# Patient Record
Sex: FEMALE | Race: WHITE | NOT HISPANIC OR LATINO | URBAN - METROPOLITAN AREA
[De-identification: names, ages, dates, MRNs, and addresses within clinical notes are randomized per-mention and may not be internally consistent; named-entity substitution may affect disease eponyms.]

---

## 2021-02-23 ENCOUNTER — PATIENT MESSAGE (OUTPATIENT)
Dept: ADMINISTRATIVE | Facility: CLINIC | Age: 71
End: 2021-02-23

## 2021-02-24 ENCOUNTER — IMMUNIZATION (OUTPATIENT)
Dept: INTERNAL MEDICINE | Facility: CLINIC | Age: 71
End: 2021-02-24
Payer: MEDICARE

## 2021-02-24 DIAGNOSIS — Z23 NEED FOR VACCINATION: Primary | ICD-10-CM

## 2021-02-24 PROCEDURE — 91300 COVID-19, MRNA, LNP-S, PF, 30 MCG/0.3 ML DOSE VACCINE: CPT | Mod: PBBFAC | Performed by: INTERNAL MEDICINE

## 2021-03-17 ENCOUNTER — IMMUNIZATION (OUTPATIENT)
Dept: INTERNAL MEDICINE | Facility: CLINIC | Age: 71
End: 2021-03-17
Payer: MEDICARE

## 2021-03-17 DIAGNOSIS — Z23 NEED FOR VACCINATION: Primary | ICD-10-CM

## 2021-03-17 PROCEDURE — 91300 COVID-19, MRNA, LNP-S, PF, 30 MCG/0.3 ML DOSE VACCINE: CPT | Mod: PBBFAC | Performed by: FAMILY MEDICINE

## 2021-03-17 PROCEDURE — 0002A COVID-19, MRNA, LNP-S, PF, 30 MCG/0.3 ML DOSE VACCINE: CPT | Mod: PBBFAC | Performed by: FAMILY MEDICINE

## 2021-03-19 ENCOUNTER — PATIENT MESSAGE (OUTPATIENT)
Dept: ADMINISTRATIVE | Facility: OTHER | Age: 71
End: 2021-03-19

## 2021-03-19 ENCOUNTER — NURSE TRIAGE (OUTPATIENT)
Dept: ADMINISTRATIVE | Facility: CLINIC | Age: 71
End: 2021-03-19

## 2024-04-02 DIAGNOSIS — R47.1 DYSARTHRIA: Primary | ICD-10-CM

## 2024-04-02 DIAGNOSIS — I63.412 CEREBROVASCULAR ACCIDENT (CVA) DUE TO EMBOLISM OF LEFT MIDDLE CEREBRAL ARTERY: ICD-10-CM

## 2024-04-02 DIAGNOSIS — R29.810 FACIAL DROOP: ICD-10-CM

## 2024-04-04 DIAGNOSIS — I63.412 ACUTE CEREBROVASCULAR ACCIDENT (CVA) DUE TO EMBOLISM OF LEFT MIDDLE CEREBRAL ARTERY: ICD-10-CM

## 2024-04-04 DIAGNOSIS — R29.810 FACIAL DROOP: ICD-10-CM

## 2024-04-04 DIAGNOSIS — R47.1 DYSARTHRIA: Primary | ICD-10-CM

## 2024-04-08 NOTE — PROGRESS NOTES
[facility]  Physical Therapy Initial Evaluation    Name: Pamella Ahmadi  Clinic Number: [id number]    Therapy Diagnosis:   Encounter Diagnoses   Name Primary?    Chronic bilateral low back pain without sciatica Yes    Decreased functional mobility and endurance      Physician: Donnell Posadas MD    Physician Orders: PT Eval and Treat   Medical Diagnosis from Referral:   R47.1 (ICD-10-CM) - Dysarthria   R29.810 (ICD-10-CM) - Facial droop   I63.412 (ICD-10-CM) - Cerebrovascular accident (CVA) due to embolism of left middle cerebral artery     Evaluation Date: 4/9/2024  Authorization Period Expiration:12/31/24  Plan of Care Expiration: 6/18/24  Visit # / Visits authorized: 1/ 1  FOTO: 1/3  PT progress note due: 30 days from 4/9/24    Precautions: Standard, hx of dizziness. LOB to the R (R vision loss since CVA) and R neglect with using RW at this time so stay SBA to CGA for safety    Time In: 3:12 pm  Time Out: 4:05 am  Total Time: 53 minutes      Subjective   Date of onset: March 30, 2024  History of current condition - Pamella reports: Pt reports that she had 2 strokes. On March 30, she reports sneezing really hard and losing vision in her right eye. She went to ER and was held overnight, but discharged. She then reports having her second stroke that next night on 4/1/2024, noticing that she was limping when she got up to use the restroom. The next morning, the right side of her face was drooping and her speech was slurred. She went back to the hospital and was admitted for 4 days, then discharged back home on 4/4/2024. She reports that her right arm movement has greatly improved since onset and she feels like she's not dragging her foot as much anymore. She reports that she knows what she wants to say, and what she wants to write, but the words (and handwriting) don't come out as intended. She reports going to the eye doctor this am and her eyes were dilated.      Pain:  Current 4/10,  worst 8/10, best 4/10   Location: denies  pain from the CVA but a hx of low back pain  Description: sore ache/stiff  Aggravating Factors: bent over the walker  Easing Factors: sit to rest    Prior Level of Function: Independent with all ADL/IADL tasks including  of small children 3 months old and up. No assistive device in past. Dizziness at times in the past with head turnings. Uses hearing aide.  Current Level of Function: using a RW for ambulation, needing assistance to shower, to  grandchildren, and to cook)     Home/Living environment: lives with her spouse, 5 adopted foster children (grown), and grandchildren     Leisure: watch TV, play games on phone ([email or web address]), and reading                Occupation:    Working presently: retired  Duties: household chores, paying bills    Imaging: see chart    Medical History:   No past medical history on file.    Surgical History:   Pamella Ahmadi  has no past surgical history on file.    Medications:   Pamella currently has no medications in their medication list.    Allergies:   Review of patient's allergies indicates:  Not on File     Pts goals: less back pain and less LOB     Objective       CMS Impairment/Limitation/Restriction for FOTO low back pain Survey    Therapist reviewed FOTO scores for Pamella Ahmadi on 4/9/2024.   FOTO documents entered into [employer] - see Media section.    Functional Score: 43       Posture/Structure: Pt presents with forward head posture, rounded shoulder's, reduced cervical lordosis, reduced thoracic kyphosis, and reduced lumbar lordosis.     Gait: anterior lean, LOB to the R, R side neglect due to LOB on R, anterior pelvic tilt    Sensation:    Intact to light touch but poor proprioception    Balance: Single leg stand R=2 sec, L=2 sec; squat sit to stand test without UE support for 30 sec 8 reps  Sit to stand has LOB at times posterior  Modified CTSIB test: to be performed on follow up as she was fatigued and simple wide  MOLLY no UE balance fatigued after 10 sec so PT didn't work on unlevel jacqui challenges.    CROM  Flexion 50  Extension 50  R Side flexion 25  L  30  R rotation 65  L 70    Lumbar spine AROM:     Degrees Pain Present (Y/N)   Flexion 5 inches from floor and having some LOB n   R side bend 15 n   L 15 n   Extend 20 n     Hip AROM:     Degrees (R/L) Pain Present (Y/N)   Hip flex 120/115 n   Hip Abd 30 n   Hip ER (sitting) 40/35 n   Hip IR (sitting) 30/40 n   Hip Ext (prone) Lags 5 degrees from neutral n     Transfers:  PT noted bed mobility is challenging for turning to side or onto stomach needing up to MIN A at times  Sit to stand at times MIN A due to LOB       Strength:    R L   Hip flexors L2   4/5 4/5  Quadriceps L234  4/5 4/5    Hamstrings S1  4+/5 4+/5   Plantar flexion S1  2/5 2/5    Dorsiflexion L4  4/5 4/5   Gluteus Medius L45S1 3+/5 3+/5   Gluteus Byron  2+/5 2+/5      Core activation with PPT 2/5; ball squeeze 4/5 hip strength    Special Test:  Slump Test:  -      Quadrant:  + extension    Be:  +      YONATAN  -        Palpation: tight lumbar paraspinal and tight suboccipitals and upper traps, tight glutes B    Neural Tension Test: negative in LE's and UE's    TREATMENT   Treatment Time In: 3:40 pm  Treatment Time Out: 4:10  pm  Total Treatment time separate from Evaluation: 30 minutes    Pamella participated in neuromuscular re-education activities to improve: Balance, Kinesthetic, and Proprioception for 30 minutes. The following activities were included:    Seated trunk flexion 2x10  Sit to stand with 2x10  Seated heel raises 2x 10  Posterior pelvic tilt 3x 10  PPT with ball squeeze in hook lying 3x 10  PT reviewed HEP with pt and bed mobility cues and forward lean cues for transfers      Home Exercises and Patient Education Provided    Education provided:   -Education on condition, HEP, and decreased endurance is very notable so PT explained to pt we will work on this and her back pain from  leaning forward so much and to address LOB concerns and R side neglect due to some Loss of vision on the R.    Written Home Exercises Provided: Patient instructed to cont prior HEP.  Exercises were reviewed and Pamella was able to demonstrate them prior to the end of the session.  Pamella demonstrated good  understanding of the education provided.     See EMR under Patient Instructions for exercises provided 2024.    Assessment   Pamella is a 73 y.o. female referred to outpatient Physical Therapy with a medical diagnosis of   R47.1 (ICD-10-CM) - Dysarthria   R29.810 (ICD-10-CM) - Facial droop   I63.412 (ICD-10-CM) - Cerebrovascular accident (CVA) due to embolism of left middle cerebral artery   . The patient presents with signs and symptoms consistent with diagnosis along with  functional mobility and endurance and with impairments which include impairments list: ROM, strength, endurance, joint mobility, muscle length, balance, posture, gait mechanics, core strength and stability, functional movement patterns, and vision loss and easily fatigued.  These impairments are limiting patient's ability to walk, manage home tasks and .     Pt prognosis is Good.   Pt will benefit from skilled outpatient Physical Therapy to address the deficits stated above and in the chart below, provide pt/family education, and to maximize pt's level of independence.     Plan of care discussed with patient: Yes  Pt's spiritual, cultural and educational needs considered and patient is agreeable to the plan of care and goals as stated below:     Anticipated Barriers for therapy: multiple family members in home    Medical Necessity is demonstrated by the following  History  Co-morbidities and personal factors that may impact the plan of care [x] LOW: no personal factors / co-morbidities  [] MODERATE: 1-2 personal factors / co-morbidities  [] HIGH: 3+ personal factors / co-morbidities    Moderate / High Support  Documentation:   Co-morbidities affecting plan of care: na    Personal Factors:   lifestyle, social background     Examination  Body Structures and Functions, activity limitations and participation restrictions that may impact the plan of care [x] LOW: addressing 1-2 elements  [] MODERATE: 3+ elements  [] HIGH: 4+ elements (please support below)    Moderate / High Support Documentation: na     Clinical Presentation [x] LOW: stable  [] MODERATE: Evolving  [] HIGH: Unstable     Decision Making/ Complexity Score: low         Goals:  Short Term Goals: In 4 weeks   1.I with HEP  2.Pt to increase lumbar ROM to reaching floor without LOB    3.Pt to increase core strength to 2+/5 with core activation.  4.Pt to increase BLE strength to 4+/5 with hip abduction   5.Pt to have pain less than 3/10 or better at all times.  6.Pt to improve score on the FOTO by 10%.  7. Pt to be educated on postural/body mechanics awareness.    Long Term Goals: In 10 weeks 6/18/24  1.Patient to improve score on the FOTO to 61.  2. Patient to demo increase in LE strength to 5/5 with hip abduction  3. Patient to have decreased pain to 2/10 at worst.  4. Patient to demo increase single leg stand to 8-10 sec per leg .  5. Patient to increase dead lifting up to 50 # to manage grandchild care.      Plan   Plan of care Certification: 4/9/2024 to 6/18/24.    Outpatient Physical Therapy 2 times weekly for 10 weeks to include the following interventions: Cervical/Lumbar Traction, Electrical Stimulation IFC, NMES, Gait Training, Manual Therapy, Moist Heat/ Ice, Neuromuscular Re-ed, Patient Education, Self Care, Therapeutic Activities, Therapeutic Exercise, and DN .     Lidia Wood, PT, CIDN, SFMA    Thank you for this referral.    These services are reasonable and necessary for the conditions set forth above while under my care.

## 2024-04-09 ENCOUNTER — CLINICAL SUPPORT (OUTPATIENT)
Dept: REHABILITATION | Facility: HOSPITAL | Age: 74
End: 2024-04-09
Attending: INTERNAL MEDICINE
Payer: MEDICARE

## 2024-04-09 ENCOUNTER — CLINICAL SUPPORT (OUTPATIENT)
Dept: REHABILITATION | Facility: HOSPITAL | Age: 74
End: 2024-04-09
Payer: MEDICARE

## 2024-04-09 DIAGNOSIS — R47.01 APHASIA: Primary | ICD-10-CM

## 2024-04-09 DIAGNOSIS — M54.50 CHRONIC BILATERAL LOW BACK PAIN WITHOUT SCIATICA: Primary | ICD-10-CM

## 2024-04-09 DIAGNOSIS — Z74.09 DECREASED FUNCTIONAL MOBILITY AND ENDURANCE: ICD-10-CM

## 2024-04-09 DIAGNOSIS — R27.8 DECREASED COORDINATION: ICD-10-CM

## 2024-04-09 DIAGNOSIS — R29.898 WEAKNESS OF RIGHT UPPER EXTREMITY: Primary | ICD-10-CM

## 2024-04-09 DIAGNOSIS — R47.1 DYSARTHRIA: ICD-10-CM

## 2024-04-09 DIAGNOSIS — G89.29 CHRONIC BILATERAL LOW BACK PAIN WITHOUT SCIATICA: Primary | ICD-10-CM

## 2024-04-09 PROCEDURE — 97112 NEUROMUSCULAR REEDUCATION: CPT | Mod: PN

## 2024-04-09 PROCEDURE — 97165 OT EVAL LOW COMPLEX 30 MIN: CPT | Mod: PN

## 2024-04-09 PROCEDURE — 97161 PT EVAL LOW COMPLEX 20 MIN: CPT | Mod: PN

## 2024-04-09 PROCEDURE — 97535 SELF CARE MNGMENT TRAINING: CPT | Mod: PN

## 2024-04-09 PROCEDURE — 92523 SPEECH SOUND LANG COMPREHEN: CPT | Mod: PN

## 2024-04-09 NOTE — PLAN OF CARE
[facility]  Speech Therapy Evaluation -Neurological Rehabilitation    Date: 4/9/2024     Name: Pamella Ahmadi   MRN: [id number]    Therapy Diagnosis:   Encounter Diagnoses   Name Primary?    Aphasia Yes    Dysarthria       Physician: Donnell Posadas MD  Physician Orders: Ambulatory Referral to Speech Therapy   Medical Diagnosis:   R47.1 (ICD-10-CM) - Dysarthria   R29.810 (ICD-10-CM) - Facial droop   I63.412 (ICD-10-CM) - Cerebral infarction due to embolism of left middle cerebral artery       Visit # / Visits Authorized:  1 / 1   Date of Evaluation:  4/9/2024   Insurance Authorization Period: 4/2/2024 to 12/31/2024  Plan of Care Certification:    4/9/2024 to 7/2/2024      Time In: 12:00 PM    Time Out: 12:35 PM    Total time: 35 minutes     Procedure   Speech Language Evaluation      Precautions: Standard and Fall  Subjective   Date of Onset: 4/2/2024  History of Current Condition:  Pamella Ahmadi is a 73 y.o. female who presents to [facility] Outpatient Speech Therapy for evaluation secondary to dysarthria. Patient was referred to therapy by Donnell Posadas MD , which is the patient's hospital medicine physician. Patient reports Over the weekend (4/5/24 - 4/6/24), she experienced an episode of sudden vision loss and went to the emergency department. She was diagnosed with central retina artery occlusion. MRI revealed no acute findings. The night after discharge, she developed balance deficits along with slurred speech and facial droop and right-sided weakness. She returned to the emergency room and was admitted to hospital medicine services. Repeat MRI revealed acute nonhemorrhagic lacunar infarct left frontal periventricular corona radiata white matter extending into the left external capsule.    Past Medical History: Pamella Ahmadi  has no past medical history on file.  Pamella Ahmadi  has no past surgical history on file.  Medical Hx and Allergies: Pamella currently has no  "medications in their medication list. Review of patient's allergies indicates:  Not on File  Prior Therapy:  Acute care ST; Patient will also participate in outpatient physical and occupational therapy  Social History:  Patient lives with her  and children and grandchildren   Occupation:  Retired   Prior Level of Function: Independent    Current Level of Function: Mild aphasia, facial droop, vision loss   Pain Scale: 0/10 on a Visual Analog Scale currently.  Pain Location: N/A  Patient's Therapy Goals:  To improve word finding     Relevant imagin2024: MRI with impressions per Fuad Rodriguez MD:   "Interval development of an acute nonhemorrhagic lacunar infarct left frontal periventricular corona radiata white matter extending into the left external capsule.  No evidence of mass effect or brain herniation. "    Chart review:   2024: Acute care SLP evaluation with impressions per Gisell Mtz CCC-SLP:   "Impressions/Plan  Ms. Ahmadi presents with mild dysarthria as well as decreased labial and lingual strength. ST to follow-up with a focus on teaching dysarthria compensatory strategies and labial/lingual resistance exercises."      Objective   Formal Assessment:  LANGUAGE EVALUATION  Western Aphasia Battery - Revised (WAB-R) was administered to evaluate the patient's receptive and expressive language function.The purpose stated in the manual for the WAB-R is to determine the presence, severity, and type of aphasia; measure the patient's level of performance to provide a baseline for detecting change over time; provide a comprehensive assessment of the patients language strengths and deficits in order to guide treatment and management; infer the location and etiology of the lesion causing the aphasia.  The following results were revealed:     DOMAIN SCORE   Spontaneous Speech Score 20/20   Auditory Comprehension Score  10/10   Repetition Score  10/10   Naming and Word Finding Score 9.7/10 " "  Aphasia Quotient (AQ) 99.4/100   Aphasia Classification  WFL     Although the patient's WAB-R score is technically within functional limits, she did demonstrate occasional word finding difficulty outside of the administration of this assessment. For example, she said "nephew" when she meant to say "grandson." She reported this is her main concern since her stroke. 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 The patient could benefit from speech language therapy to address mild word finding deficits in conversational speech.       MOTOR SPEECH/DYSARTHRIA EVALUATION  Evaluation of Speech Mechanisms  Respiration:  Posture: WNL  Breath Support: WNL  Breath Rate: WNL.   Respiratory Features: WNL: Diaphragmatic Breathing    Oral Mechanism at Rest   Labial Structures  Structure WNL   Symmetry Right   Tone reduced   Ability to control secretions WNL       Lingual Structure (at rest in mouth)   Structure WNL   Symmetry WNL   Tone WNL   Irregular Movement WNL       Mandible  Symmetry WNL   Posture at Rest WNL=closed   Dentition WNL     Face  Symmetry Right Facial Droop   Expression WNL   Irregular Movement WNL     Soft Palate  Symmetry WNL   Structure WNL     Hard Palate  Structure WNL     Oral Mechanism during Sustained Postures   Labial Retraction   Symmetry Right Reduced   Range Reduced   Tone Reduced   Strength Reduced     Labial Protrusion  Range Reduced   Tone Reduced   Strength Reduced     Labial Compression  Strength (Upper R) Reduced   Strength (Upper L) WNL   Strength (Lower R) Reduced   Strength (Lower L) WNL     Lingual Protrusion  Symmetry WNL   Range WNL   Tone WNL   Tremor WNL   Strength WNL     Lingual Elevation to Alveolar Ridge   Range WNL   Symmetry WNL     Mandible Depression  Symmetry Right " "  Range Reduced   Jaw Clonus WNL   Strength WNL     Mandible Elevation  Symmetry WNL   Range WNL   Strength WNL     Face: Raising Eyebrows  Symmetry WNL   Range WNL   Forehead Wrinkle WNL     Velopharyngeal Function: Cheek Puffing   Symmetry WNL   Range WNL   Tone WNL   Strength WNL       Oral Mechanism during Movement   Labial Protrusion and Lateralization   Rate Slow   Rhythm WNL     Lingual Lateralization (External)   Rate Slow   Rhythm WNL   Range WNL     Lingual Lateralization (Internal)  Rate Slow   Rhythm WNL   Range WNL     Circular Range of Motion (External)   Rate Slow   Rhythm WNL   Range WNL     Circular Range of Motion (Internal)  Rate Slow   Rhythm WNL   Range WNL     Velopharyngeal Function: Sustained /a/  Velum Range WNL   Pharyngeal Wall Range WNL     Velopharyngeal Function: Short Repeating /a/  Velum Range WNL   Pharyngeal Wall Range WNL     Gross Sensation  Sensation  Face: Upper L WNL    Upper R WNL    Lower L WNL    Lower R WNL   Lips: Upper L WNL    Upper R WNL    Lower L WNL    Lower R WNL     Diagnosis   OVERALL IMPRESSION:   Patient presents with decreased labial strength and ROM specifically on the right side. However, this does not appear to be impacting her speech at this time and the "slurred" speech appears to have resolved. She remains 100% intelligible is does not report concerns regarding speech clarity at this time.     Treatment   Total Treatment Time Separate from Evaluation: not applicable   No treatment performed secondary to time to complete evaluation.     Education provided:   -role of Speech Therapy, goals/plan of care, scheduling/cancellations, insurance limitations with patient  -Additional Education provided:   The nature of aphasia versus dysarthria    Patient expressed understanding.     Home Program: not yet established  Assessment     Pamella presents to [facility] status post medical diagnosis of Dysarthria.     Interpretation of objective " assessment:   She presents with a mild right labial droop that does not impact speech clarity or oral phase of the swallow. She presents with very mild anomic asphasia characterized by word finding difficulty at the conversational level and semantic paraphasias. She will benefit form skilled therapy to address word finding deficits in higher level language tasks.     Demonstrates impairments including limitations as described in the problem list.     Positive prognostic factors: Patient motivation/ recent onset   Negative prognostic factors: none  Barriers to therapy: No barriers to therapy identified.     Patient's spiritual, cultural, and educational needs considered and patient agreeable to plan of care and goals.    Patient will benefit from skilled therapy.    Rehab Potential: excellent        Short Term Goals: (6 weeks) Current Progress:   1. Patient will complete reading/writing assessment.     Progressing/ Not Met 4/9/2024   Established this date   2. Patient will use Semantic Feature Analysis to describe objects with 90% accuracy and will self-monitor verbal output of message with moderate clinician redirection.     Progressing/ Not Met 4/9/2024   Established this date   3.  Patient will utilize word finding strategies in structured tasks with 90% accuracy and minimal cues.     Progressing/ Not Met 4/9/2024   Established this date    4. Patient will achieve 90% accuracy on oral expression while reading sentences out loud in unison with use of the Oral Reading for Language in Aphasia Protocol (DREA).     Progressing/ Not Met 4/9/2024   Established this date    5.  Patient will summarize read material to improve word fluency, word finding, and reading comprehension in Attentive Reading and Constrained Summarization (ARCS) protocol with 80% accuracy and minimal verbal assistance across 2-3 sessions.    Progressing/ Not Met 4/9/2024   Established this date        Long Term Goals: (12 weeks) Current Progress:    1. Patient will improve oral expression for improved overall communication in medical, social, vocational, home environments.    Established this date     2. Patient will improve reading comprehension and writing skills for overall improved communication in medical, social, vocational, home environments.      Established this date         Plan     Recommended Treatment Plan:  Patient will participate in the [facility] rehabilitation program for speech therapy 1 times per week for 12 weeks to address her Communication deficits, to educate patient and their family, and to participate in a home exercise program.    Other Recommendations:   Referral to Neurology    Therapist's Name:     Mercy Pang, CCC-SLP, CBIS   Speech Language Pathologist   Certified Brain Injury Specialist   4/11/2024

## 2024-04-09 NOTE — PATIENT INSTRUCTIONS
Practice handwriting and journaling (used lined paper). Try speaking words out loud too.  Practice utilizing stylus on your phone.

## 2024-04-09 NOTE — PLAN OF CARE
[facility]   Occupational Therapy Initial Neurological Evaluation     Date: 4/9/2024  Patient: Pamella Ahmadi  Chart Number: [id number]    Therapy Diagnosis:   Encounter Diagnoses   Name Primary?    Weakness of right upper extremity Yes    Decreased coordination      Physician: Donnell Posadas MD    Physician Orders: OT eval and tx  Medical Diagnosis: Dysarthria [R47.1], Facial droop [R29.810], Cerebrovascular accident (CVA) due to embolism of left middle cerebral artery [I63.412]   Evaluation Date: 4/9/2024  Insurance Authorization Period Expiration: 12/31/2024  Plan of Care Certification Period: 4/9/2024 to 6/7/2024  Progress Note Due: 30 days (or 5/9/2024)     Visit # / Visits authorized: 1/1  FOTO: 1/3  Date of Return to MD: sees cardiologist on 4/10/2024 and PCP on 4/11/2024    Precautions: HTN; implanted cardiac monitor (ICM); pre-diabetic    Time In: 1050  Time Out: 1150  Total Appointment Time (timed & untimed codes): 60 minutes    Subjective     Involved Side: right  Dominant Side: Right    Date of Onset: 3/30/2024  Mechanism of Injury/ History of Current Condition: Pt reports that she had 2 strokes. On March 30, she reports sneezing really hard and losing vision in her right eye. She went to ER and was held overnight, but discharged. She then reports having her second stroke that next night on 4/1/2024, noticing that she was limping when she got up to use the restroom. The next morning, the right side of her face was drooping and her speech was slurred. She went back to the hospital and was admitted for 4 days, then discharged back home on 4/4/2024. She reports that her right arm movement has greatly improved since onset and she feels like she's not dragging her foot as much anymore. She reports that she knows what she wants to say, and what she wants to wrist, but the words (and handwriting) don't come out as intended. She reports going to the eye doctor this am and her eyes  "were dilated.    Surgical Procedure: none  Imaging: none    Previous Therapy: limited therapy in acute care while admitted for 4 days    Patient's Goals for Therapy: "help me improve my handwriting and forming my numbers and letters better"    Pain:  Functional Pain Scale Rating 0-10:   n/a/10 on average  n/a/10 at best  n/a/10 at worst  Location: RUE    Functional Limitations/Social History:    Prior Level of Function: Independent with all ADL/IADL tasks  Current Level of Function: using a RW for ambulation, needing assistance to shower, to  grandchildren, and to cook)    Home/Living environment: lives with her spouse, 5 adopted foster children (grown), and grandchildren    Leisure: watch TV, play games on phone ([employer]), and reading     Occupation:    Working presently: retired  Duties: household chores, paying bills      Past Medical History/Physical Systems Review:     Past Medical History:  Pamella Ahmadi  has no past medical history on file.    Past Surgical History:  Pamella Ahmadi  has no past surgical history on file.    Current Medications:  Pamella currently has no medications in their medication list.    Allergies:  Review of patient's allergies indicates:  Not on File       Objective     Cognitive Exam:  Oriented: Person, Place, Time, and Situation  Behaviors: normal, cooperative  Follows Commands/attention: Follows multistep commands  Communication: dysarthria  Memory: No Deficits noted   Safety awareness/insight to disability: aware of diagnosis, treatment, and prognosis  Coping skills/emotional control: Appropriate to situation    Visual/Perceptual:  Tracking: intact    Comments: eyes dilated during evaluation 2* eye doctor apt this am; pt reports right eye vision obscured (looks like gray paint with little scratches in it); reports left eye is a little blurry    Right Upper Extremity AROM: WNL as compared to LUE    Right Fist: normal  Able to form a full composite, hook, and lumbrical " fist      Strength 4/9/2024 4/9/2024   **within available ROM** Right Left   Shoulder Flex 4+/5 5/5   Shoulder Abd 4+/5 5/5   Shoulder IR 5/5 5/5   Shoulder ER 4+/5 5/5   Elbow Flex 5/5 5/5   Elbow Ext 4+/5 5/5      Strength: (LISANDRA Dynamometer in lbs.) Average 3 trials, Position II:     4/9/2024 4/9/2024    Right Left   Rung II 40# 35#     Pinch Strength (Measured in psi)     4/9/2024 4/9/2024    Right Left   Key Pinch 13 psi 13 psi   3pt Pinch 11 psi 9 psi   2pt Pinch 7 psi 7 psi     Fine Motor Coordination: 9-Hole Peg Test  Right  4/9/2024 Left  4/9/2024   27 sec 33 sec   Unable to assess with LISANDRA Grooved Pegboard 2* eyes dilated    Tone:  Modified Eleni Scale:   0 - No increase in muscle tone    Sensation:    Light touch: right intact  Sharp/Dull: right intact  Kinesthesia: right intact  Proprioception: right impaired  Temperature: right intact    Endurance Deficit: moderate - pt reports fatiguing easily                     Functional Status      Functional Mobility: (uses a RW)  Stand to sit: Mod I  Sit to stand: Mod I  Transfers to tub/shower: Mod I  Tub/shower combo with shower seat  and grab bars - must step over  Transfers to toilet: Mod I  Car transfers: Mod I  Wheelchair mobility: n/a    ADL's:  Feeding: I  Grooming: I  Hygiene: I  UB Dressing: I  LB Dressing: I  Toileting: I  Bathing: Min A    IADL's:  Homecare: Min A  Cooking: Min A  Laundry: Min A  Yard work: Max A  Use of telephone: I  Money management: Min A  Medication management: Min A  Handwriting: Mod A  Technology Use: Min A      Intake Outcome Measure for FOTO Stroke Upper Extremity Survey    Therapist reviewed FOTO scores for Pamella Ahmadi on 4/9/2024.   FOTO documents entered into [employer] - see Media section.    Intake Score: 59%     Predicted Functional Score: 73%     Treatment     Total Treatment time separate from Evaluation time: 25 minutes    Pamella received the treatments listed below:      neuromuscular re-education  activities to improve: Coordination for 10 minutes, including:  - HEP    Self-care techniques to improve independence and safety with ADL/IADL tasks for 15 minutes, including:  - toileting and toilet transfer  - grooming: hand hygiene  - sit<-->stand transfers  - education on the healing process and expectations    Home Exercises and Patient Education Provided    Education provided:   - Role of OT, goals for OT, scheduling/cancellations    Written Home Exercises Provided: Yes  Exercises were reviewed and Pamella was able to demonstrate them prior to the end of the session. Pamella demonstrated good  understanding of the education provided.     See EMR under Patient Instructions for exercises provided on 4/9/2024    Assessment     Pamella Ahmadi is a 73 y.o. female referred to outpatient occupational therapy and presents with a medical diagnosis of Dysarthria [R47.1], Facial droop [R29.810], Cerebrovascular accident (CVA) due to embolism of left middle cerebral artery [I63.412], resulting in decreased muscle strength and impaired function and demonstrates limitations as described in the chart below.     Following medical record review it is determined that patient will benefit from occupational therapy services in order to maximize pain free and/or functional use of right UE.    Patient prognosis is Good   Patient will benefit from skilled outpatient Occupational Therapy to address the deficits stated above and in the chart below, provide patient/family education, and to maximize patient's level of independence.     Plan of care discussed with patient: Yes  Patient's spiritual, cultural and educational needs considered and patient is agreeable to the plan of care and goals as stated below:     Anticipated Barriers for therapy: none    Medical Necessity is demonstrated by the following  Occupational Profile/History  Co-morbidities and personal factors that may impact the plan of care [x] LOW: Brief chart review  []  MODERATE: Expanded chart review   [] HIGH: Extensive chart review    Moderate / High Support Documentation: n/a     Examination  Performance deficits relating to physical, cognitive or psychosocial skills that result in activity limitations and/or participation restrictions  [x] LOW: addressing 1-3 Performance deficits  [] MODERATE: 3-5 Performance deficits  [] HIGH: 5+ Performance deficits (please support below)    Moderate / High Support Documentation:    Physical:  Muscle Power/Strength  Muscle Endurance  Fine Motor Coordination  Proprioception Functions    Cognitive:  No Deficits    Psychosocial:    No Deficits     Treatment Options [x] LOW: Limited options  [] MODERATE: Several options  [] HIGH: Multiple options      Decision Making/ Complexity Score: low       The following goals were discussed with the patient and patient is in agreement with them as to be addressed in the treatment plan.     Goals:  Short Term Goals: 4 weeks   Pt will be independent with HEP.  Pt will tolerate right upper extremity strengthening and endurance activities.  Pt will report increased legibility of handwriting.  Pt will report an increase in FOTO intake score of > 62%, which would indicate an improvement in quality of life.    Long Term Goals: 8 weeks   Pt will exhibit right shoulder MMT/strength of 5/5 needed for picking up grandchildren.  Pt will exhibit improved coordination, control, and legibility with all handwriting tasks.  Pt will report an increase in FOTO intake score of > 68%, which would indicate an improvement in quality of life.    Plan   Certification Period/Plan of care expiration: 4/9/2024 to 6/7/2024    Outpatient Occupational Therapy 1-2 times weekly for 8 weeks to include the following interventions: Manual Therapy, Moist Heat/ Ice, Neuromuscular Re-ed, Patient Education, Self Care, Therapeutic Activities, and Therapeutic Exercise.    VIVIENNE ZUNIGA OT

## 2024-04-09 NOTE — PROGRESS NOTES
See initial POC.     Mercy Pang, CCC-SLP, CBIS   Speech Language Pathologist   Certified Brain Injury Specialist   4/9/2024

## 2024-04-09 NOTE — PLAN OF CARE
[facility]  Physical Therapy Initial Evaluation    Name: Pamella Ahmadi  Clinic Number: [id number]    Therapy Diagnosis:   Encounter Diagnoses   Name Primary?    Chronic bilateral low back pain without sciatica Yes    Decreased functional mobility and endurance      Physician: Donnell Posadas MD    Physician Orders: PT Eval and Treat   Medical Diagnosis from Referral:   R47.1 (ICD-10-CM) - Dysarthria   R29.810 (ICD-10-CM) - Facial droop   I63.412 (ICD-10-CM) - Cerebrovascular accident (CVA) due to embolism of left middle cerebral artery     Evaluation Date: 4/9/2024  Authorization Period Expiration:12/31/24  Plan of Care Expiration: 6/18/24  Visit # / Visits authorized: 1/ 1  FOTO: 1/3  PT progress note due: 30 days from 4/9/24    Precautions: Standard, hx of dizziness. LOB to the R (R vision loss since CVA) and R neglect with using RW at this time so stay SBA to CGA for safety    Time In: 3:12 pm  Time Out: 4:05 am  Total Time: 53 minutes      Subjective   Date of onset: March 30, 2024  History of current condition - Pamella reports: Pt reports that she had 2 strokes. On March 30, she reports sneezing really hard and losing vision in her right eye. She went to ER and was held overnight, but discharged. She then reports having her second stroke that next night on 4/1/2024, noticing that she was limping when she got up to use the restroom. The next morning, the right side of her face was drooping and her speech was slurred. She went back to the hospital and was admitted for 4 days, then discharged back home on 4/4/2024. She reports that her right arm movement has greatly improved since onset and she feels like she's not dragging her foot as much anymore. She reports that she knows what she wants to say, and what she wants to write, but the words (and handwriting) don't come out as intended. She reports going to the eye doctor this am and her eyes were dilated.      Pain:  Current 4/10,  worst 8/10, best 4/10   Location: denies  pain from the CVA but a hx of low back pain  Description: sore ache/stiff  Aggravating Factors: bent over the walker  Easing Factors: sit to rest    Prior Level of Function: Independent with all ADL/IADL tasks including  of small children 3 months old and up. No assistive device in past. Dizziness at times in the past with head turnings. Uses hearing aide.  Current Level of Function: using a RW for ambulation, needing assistance to shower, to  grandchildren, and to cook)     Home/Living environment: lives with her spouse, 5 adopted foster children (grown), and grandchildren     Leisure: watch TV, play games on phone ([employer]), and reading                Occupation:    Working presently: retired  Duties: household chores, paying bills    Imaging: see chart    Medical History:   No past medical history on file.    Surgical History:   Pamella Ahmadi  has no past surgical history on file.    Medications:   Pamella currently has no medications in their medication list.    Allergies:   Review of patient's allergies indicates:  Not on File     Pts goals: less back pain and less LOB     Objective       CMS Impairment/Limitation/Restriction for FOTO low back pain Survey    Therapist reviewed FOTO scores for Pamella Ahmadi on 4/9/2024.   FOTO documents entered into [employer] - see Media section.    Functional Score: 43       Posture/Structure: Pt presents with forward head posture, rounded shoulder's, reduced cervical lordosis, reduced thoracic kyphosis, and reduced lumbar lordosis.     Gait: anterior lean, LOB to the R, R side neglect due to LOB on R, anterior pelvic tilt    Sensation:    Intact to light touch but poor proprioception    Balance: Single leg stand R=2 sec, L=2 sec; squat sit to stand test without UE support for 30 sec 8 reps  Sit to stand has LOB at times posterior  Modified CTSIB test: to be performed on follow up as she was fatigued and simple wide  MOLLY no UE balance fatigued after 10 sec so PT didn't work on unlevel jacqui challenges.    CROM  Flexion 50  Extension 50  R Side flexion 25  L  30  R rotation 65  L 70    Lumbar spine AROM:     Degrees Pain Present (Y/N)   Flexion 5 inches from floor and having some LOB n   R side bend 15 n   L 15 n   Extend 20 n     Hip AROM:     Degrees (R/L) Pain Present (Y/N)   Hip flex 120/115 n   Hip Abd 30 n   Hip ER (sitting) 40/35 n   Hip IR (sitting) 30/40 n   Hip Ext (prone) Lags 5 degrees from neutral n     Transfers:  PT noted bed mobility is challenging for turning to side or onto stomach needing up to MIN A at times  Sit to stand at times MIN A due to LOB       Strength:    R L   Hip flexors L2   4/5 4/5  Quadriceps L234  4/5 4/5    Hamstrings S1  4+/5 4+/5   Plantar flexion S1  2/5 2/5    Dorsiflexion L4  4/5 4/5   Gluteus Medius L45S1 3+/5 3+/5   Gluteus Byron  2+/5 2+/5      Core activation with PPT 2/5; ball squeeze 4/5 hip strength    Special Test:  Slump Test:  -      Quadrant:  + extension    Be:  +      YONATAN  -        Palpation: tight lumbar paraspinal and tight suboccipitals and upper traps, tight glutes B    Neural Tension Test: negative in LE's and UE's    TREATMENT   Treatment Time In: 3:40 pm  Treatment Time Out: 4:10  pm  Total Treatment time separate from Evaluation: 30 minutes    Pamella participated in neuromuscular re-education activities to improve: Balance, Kinesthetic, and Proprioception for 30 minutes. The following activities were included:    Seated trunk flexion 2x10  Sit to stand with 2x10  Seated heel raises 2x 10  Posterior pelvic tilt 3x 10  PPT with ball squeeze in hook lying 3x 10  PT reviewed HEP with pt and bed mobility cues and forward lean cues for transfers      Home Exercises and Patient Education Provided    Education provided:   -Education on condition, HEP, and decreased endurance is very notable so PT explained to pt we will work on this and her back pain from  leaning forward so much and to address LOB concerns and R side neglect due to some Loss of vision on the R.    Written Home Exercises Provided: Patient instructed to cont prior HEP.  Exercises were reviewed and Pamella was able to demonstrate them prior to the end of the session.  Pamella demonstrated good  understanding of the education provided.     See EMR under Patient Instructions for exercises provided 2024.    Assessment   Pamella is a 73 y.o. female referred to outpatient Physical Therapy with a medical diagnosis of   R47.1 (ICD-10-CM) - Dysarthria   R29.810 (ICD-10-CM) - Facial droop   I63.412 (ICD-10-CM) - Cerebrovascular accident (CVA) due to embolism of left middle cerebral artery   . The patient presents with signs and symptoms consistent with diagnosis along with  functional mobility and endurance and with impairments which include impairments list: ROM, strength, endurance, joint mobility, muscle length, balance, posture, gait mechanics, core strength and stability, functional movement patterns, and vision loss and easily fatigued.  These impairments are limiting patient's ability to walk, manage home tasks and .     Pt prognosis is Good.   Pt will benefit from skilled outpatient Physical Therapy to address the deficits stated above and in the chart below, provide pt/family education, and to maximize pt's level of independence.     Plan of care discussed with patient: Yes  Pt's spiritual, cultural and educational needs considered and patient is agreeable to the plan of care and goals as stated below:     Anticipated Barriers for therapy: multiple family members in home    Medical Necessity is demonstrated by the following  History  Co-morbidities and personal factors that may impact the plan of care [x] LOW: no personal factors / co-morbidities  [] MODERATE: 1-2 personal factors / co-morbidities  [] HIGH: 3+ personal factors / co-morbidities    Moderate / High Support  Documentation:   Co-morbidities affecting plan of care: na    Personal Factors:   lifestyle, social background     Examination  Body Structures and Functions, activity limitations and participation restrictions that may impact the plan of care [x] LOW: addressing 1-2 elements  [] MODERATE: 3+ elements  [] HIGH: 4+ elements (please support below)    Moderate / High Support Documentation: na     Clinical Presentation [x] LOW: stable  [] MODERATE: Evolving  [] HIGH: Unstable     Decision Making/ Complexity Score: low         Goals:  Short Term Goals: In 4 weeks   1.I with HEP  2.Pt to increase lumbar ROM to reaching floor without LOB    3.Pt to increase core strength to 2+/5 with core activation.  4.Pt to increase BLE strength to 4+/5 with hip abduction   5.Pt to have pain less than 3/10 or better at all times.  6.Pt to improve score on the FOTO by 10%.  7. Pt to be educated on postural/body mechanics awareness.    Long Term Goals: In 10 weeks 6/18/24  1.Patient to improve score on the FOTO to 61.  2. Patient to demo increase in LE strength to 5/5 with hip abduction  3. Patient to have decreased pain to 2/10 at worst.  4. Patient to demo increase single leg stand to 8-10 sec per leg .  5. Patient to increase dead lifting up to 50 # to manage grandchild care.      Plan   Plan of care Certification: 4/9/2024 to 6/18/24.    Outpatient Physical Therapy 2 times weekly for 10 weeks to include the following interventions: Cervical/Lumbar Traction, Electrical Stimulation IFC, NMES, Gait Training, Manual Therapy, Moist Heat/ Ice, Neuromuscular Re-ed, Patient Education, Self Care, Therapeutic Activities, Therapeutic Exercise, and DN.     Lidia Wood, PT, CIDN, SFMA    Thank you for this referral.    These services are reasonable and necessary for the conditions set forth above while under my care.

## 2024-04-16 ENCOUNTER — CLINICAL SUPPORT (OUTPATIENT)
Dept: REHABILITATION | Facility: HOSPITAL | Age: 74
End: 2024-04-16
Payer: MEDICARE

## 2024-04-16 DIAGNOSIS — Z74.09 DECREASED FUNCTIONAL MOBILITY AND ENDURANCE: ICD-10-CM

## 2024-04-16 DIAGNOSIS — M54.50 CHRONIC BILATERAL LOW BACK PAIN WITHOUT SCIATICA: Primary | ICD-10-CM

## 2024-04-16 DIAGNOSIS — G89.29 CHRONIC BILATERAL LOW BACK PAIN WITHOUT SCIATICA: Primary | ICD-10-CM

## 2024-04-16 DIAGNOSIS — R29.898 WEAKNESS OF BOTH LOWER EXTREMITIES: Primary | ICD-10-CM

## 2024-04-16 DIAGNOSIS — R27.8 DECREASED COORDINATION: ICD-10-CM

## 2024-04-16 PROCEDURE — 97110 THERAPEUTIC EXERCISES: CPT | Mod: PN

## 2024-04-16 PROCEDURE — 97530 THERAPEUTIC ACTIVITIES: CPT | Mod: PN

## 2024-04-16 PROCEDURE — 97112 NEUROMUSCULAR REEDUCATION: CPT | Mod: PN

## 2024-04-16 PROCEDURE — 97535 SELF CARE MNGMENT TRAINING: CPT | Mod: PN

## 2024-04-16 NOTE — PROGRESS NOTES
Physical Therapy Daily Treatment Note     Name: Pamella Ahmadi  Clinic Number: [id number]    Therapy Diagnosis:   Encounter Diagnoses   Name Primary?    Chronic bilateral low back pain without sciatica Yes    Decreased functional mobility and endurance      Physician: Donnell Posadas MD     Visit Date: 4/16/2024    Physician Orders: PT Eval and Treat   Medical Diagnosis from Referral:   R47.1 (ICD-10-CM) - Dysarthria   R29.810 (ICD-10-CM) - Facial droop   I63.412 (ICD-10-CM) - Cerebrovascular accident (CVA) due to embolism of left middle cerebral artery      Evaluation Date: 4/9/2024  Authorization Period Expiration:12/31/24  Plan of Care Expiration: 6/18/24  Visit # / Visits authorized: 2/ 20  FOTO: 1/3  PT progress note due: 30 days from 4/9/24     Precautions: Standard, hx of dizziness. LOB to the R (R vision loss since CVA) and R neglect with using RW at this time so stay SBA to CGA for safety      Time In: 2:03 pm   Time Out: 2:58 pm  Total Time: 54 minutes    SUBJECTIVE     Today, pt reports: she had some lateral shin nodule that has reduced but didn't know the cause.  She was compliant with home exercise program.  Response to previous treatment: sore  Functional change: none yet    Pre-Treatment Pain: 5/10    Location: R low back  TREATMENT     Pamella received therapeutic exercises to develop strength and flexibility for 8 minutes including:    Shuttle 4 bands 3 min increase quad motor control  Prone maisha 2x 8 (Ue's fatigue; feels pulling in core)      Pamella received the following manual therapy techniques: Manual traction and Soft tissue Mobilization were applied to the: 0 for 0 minutes, including:    Pamella participated in neuromuscular re-education activities to improve: Balance, Kinesthetic, and Proprioception for 46 minutes. The following activities were included:    Heel raises 2x 10  Toe raises 2x 10  SLR 2x 10 B increase quad motor control  PEC with plinth elevated  L glute med engaged  with plinth elevated 3x 10  Leaning hip extension 2x 10  Seated LAQ 4# 3x 10 per leg add ball squeeze on set 2 and 3      Pamella participated in dynamic functional therapeutic activities to improve functional performance for 0  minutes, including:          Pamella participated in gait training to improve functional mobility and safety for 0  minutes, including:      Pamella received the following direct contact modalities after being cleared for contraindications:     Pamella received the following supervised modalities after being cleared for contradictions:     Pamella received hot pack for 0 minutes to 0.    Pamella received cold pack for 0 minutes to 0.  Pamella received electrical stimulation for 0 minutes to 0      Home Exercises Provided and Patient Education Provided     Education/Self-Care provided: (during therex)    Patient educated on the importance of improved core and upper and lower extremity strength in order to improve alignment of the spine and upper and lower extremities with static positions and dynamic movement.   Patient educated on the importance of strong core and lower extremity musculature in order to improve both static and dynamic balance, improve gait mechanics, reduce fall risk and improve household and community mobility.       Written Home Exercises Provided: Patient instructed to cont prior HEP.  Exercises were reviewed and Pamella was able to demonstrate them prior to the end of the session.  Pamella demonstrated good  understanding of the education provided.     See EMR under Patient Instructions for exercises provided 4/16/2024.    ASSESSMENT   Pt tolerated therex  with PT focusing on motor control in the hips and core as she is weak and feels R low back pain from core instability as well as the R knee feels it wants to buckle at times. L LAQ was more poor in eccentric control than the R so pt was surprised. PT to advance as tolerated as she fatigues quickly. Pt neglects  the R with gait some and is very challenged by bed mobility so safety cues used to reduce rolling off edge of bed.  Pt demonstrated good understanding of exercises and required moderate cueing to maintain proper form. Pt fatigues quickly and became nauseated mildly at end of session. PT held off on continuing and suggested a cool rag and cool water but she declined for now. Pt may be having some vestibular issues on trunk rotation machine so PT to monitor. PT educated pt on signs of DVT and assessed pedal and femoral pulse and both were intact in the R UE and no irregular swelling and discoloration of the skin was noted. PT educated pt to go to the ER of chest pain began in any way since she was concerned her varicose veins may be a DVT.      Pamella Is progressing well towards her goals.   Pt prognosis is Good.     Pt will continue to benefit from skilled outpatient physical therapy to address the deficits listed in the problem list box on initial evaluation, provide pt/family education and to maximize pt's level of independence in the home and community environment.     Pt's spiritual, cultural and educational needs considered and pt agreeable to plan of care and goals.     Anticipated barriers to physical therapy: multiple strokes    Goals:   Short Term Goals: In 4 weeks   1.I with HEP  2.Pt to increase lumbar ROM to reaching floor without LOB    3.Pt to increase core strength to 2+/5 with core activation.  4.Pt to increase BLE strength to 4+/5 with hip abduction   5.Pt to have pain less than 3/10 or better at all times.  6.Pt to improve score on the FOTO by 10%.  7. Pt to be educated on postural/body mechanics awareness.     Long Term Goals: In 10 weeks 6/18/24  1.Patient to improve score on the FOTO to 61.  2. Patient to demo increase in LE strength to 5/5 with hip abduction  3. Patient to have decreased pain to 2/10 at worst.  4. Patient to demo increase single leg stand to 8-10 sec per leg .  5. Patient to  increase dead lifting up to 50 # to manage grandchild care.        Plan   Plan of care Certification: 4/9/2024 to 6/18/24.     Outpatient Physical Therapy 2 times weekly for 10 weeks to include the following interventions: Cervical/Lumbar Traction, Electrical Stimulation IFC, NMES, Gait Training, Manual Therapy, Moist Heat/ Ice, Neuromuscular Re-ed, Patient Education, Self Care, Therapeutic Activities, Therapeutic Exercise, and DN.      Continue Plan of Care (POC) and progress per patient tolerance.    Lidia Wood, PT, CIDN, SFMA

## 2024-04-16 NOTE — PROGRESS NOTES
"  [facility]  Occupational Therapy Treatment Note     Date: 4/16/2024  Name: Pamella Ahmadi  Clinic Number: [id number]    Therapy Diagnosis:   Encounter Diagnoses   Name Primary?    Weakness of both lower extremities Yes    Decreased coordination      Physician: Donnell Posadas MD    Physician Orders: OT eval and tx  Medical Diagnosis: Dysarthria [R47.1], Facial droop [R29.810], Cerebrovascular accident (CVA) due to embolism of left middle cerebral artery [I63.412]   Evaluation Date: 4/9/2024  Insurance Authorization Period Expiration: 9/29/2025  Plan of Care Certification Period: 4/9/2024 to 6/7/2024  Progress Note Due: 30 days (or 5/9/2024)      Visit # / Visits authorized: 1/20  FOTO: 1/3  Date of Return to MD: PRN     Precautions: HTN; implanted cardiac monitor (ICM); pre-diabetic    Time In: 1300  Time Out: 1400  Total Billable Time: 60 minutes    Subjective     Pt reports: "I did my homework. My right arm is feeling pretty good."    she was compliant with home exercise program given on evaluation.  Response to previous treatment: good  Functional change: improved handwriting legibility    Pain: 0/10 (none on evaluation)  Location: RUE    Objective   Objective measures updated at progress report unless specified.    Treatment     Pamella received the treatments listed below:      therapeutic exercises to develop strength and ROM for 15 minutes including:  - UBE x 6 min  - chest press with 3# dowel 2x10  - overhead press with 3# dowel 2x10  - composite  strengthening with Progressive Hand Exerciser (3rd position) 5x10    neuromuscular re-education activities to improve Coordination and Proprioception for 0 minutes including:  - NT    therapeutic activities to improve functional performance of RUE for 35 minutes including:  - practiced different handwriting techniques: writing on horizontal vs vertical surfaces, using different-sized pens, changing paper position, using " darker-lined paper    Self-care x 10 minutes:  - toileting and grooming with [place]    direct contact modalities after being cleared for contraindications for 0 minutes including:  - NT    supervised modalities after being cleared for contradictions for 0 minutes including:  - NT    Home Exercises and Education Provided     Education provided:   - reviewed HEP issued at evaluation  - progress toward goals    Written Home Exercises Provided: Patient instructed to cont prior HEP  Exercises were reviewed and Pamella was able to demonstrate them prior to the end of the session. Pamella demonstrated good understanding of the HEP provided.     See EMR under Patient Instructions for exercises provided during prior visit.        Assessment     Pamella was seen for her first tx session since initial evaluation on this date. She was compliant with practicing her handwriting at home and brought a paper full of handwriting to therapy. Handwriting fully legible but pt indicated concerns about letter spacing and upward drifting of her words. She appears to be most limited by right visual deficits rather than coordination with her handwriting. Recommended she use dark-lined paper.     Pamella is progressing towards her goals and there are no updates to goals at this time. Pt prognosis is Good.     Pamella will continue to benefit from skilled outpatient occupational therapy services to address the deficits listed in the problem list on initial evaluation, to provide pt/family education, and to maximize pt's level of independence in the home and community environment.     Pt's spiritual, cultural and educational needs considered and pt agreeable to plan of care and goals.    Anticipated barriers to occupational therapy: none    Goals:  Short Term Goals: 4 weeks   Pt will be independent with HEP. - MET 4/16/2024  Pt will tolerate right upper extremity strengthening and endurance activities.  Pt will report increased  legibility of handwriting. - MET 4/16/2024  Pt will report an increase in FOTO intake score of > 62%, which would indicate an improvement in quality of life.     Long Term Goals: 8 weeks   Pt will exhibit right shoulder MMT/strength of 5/5 needed for picking up grandchildren.  Pt will exhibit improved coordination, control, and legibility with all handwriting tasks.  Pt will report an increase in FOTO intake score of > 68%, which would indicate an improvement in quality of life.    Plan     Certification Period/Plan of care expiration: 4/9/2024 to 6/7/2024     Outpatient Occupational Therapy 1-2 times weekly for 8 weeks to include the following interventions: Manual Therapy, Moist Heat/ Ice, Neuromuscular Re-ed, Patient Education, Self Care, Therapeutic Activities, and Therapeutic Exercise    Updates/Grading for next session: progress occupational therapy as tolerated    VIVIENNE ZUNIGA OT

## 2024-04-19 ENCOUNTER — CLINICAL SUPPORT (OUTPATIENT)
Dept: REHABILITATION | Facility: HOSPITAL | Age: 74
End: 2024-04-19
Payer: MEDICARE

## 2024-04-19 DIAGNOSIS — M54.50 CHRONIC BILATERAL LOW BACK PAIN WITHOUT SCIATICA: Primary | ICD-10-CM

## 2024-04-19 DIAGNOSIS — R47.1 DYSARTHRIA: ICD-10-CM

## 2024-04-19 DIAGNOSIS — R47.01 APHASIA: Primary | ICD-10-CM

## 2024-04-19 DIAGNOSIS — Z74.09 DECREASED FUNCTIONAL MOBILITY AND ENDURANCE: ICD-10-CM

## 2024-04-19 DIAGNOSIS — G89.29 CHRONIC BILATERAL LOW BACK PAIN WITHOUT SCIATICA: Primary | ICD-10-CM

## 2024-04-19 PROCEDURE — 92507 TX SP LANG VOICE COMM INDIV: CPT | Mod: PN

## 2024-04-19 PROCEDURE — 97112 NEUROMUSCULAR REEDUCATION: CPT | Mod: PN

## 2024-04-19 PROCEDURE — 97110 THERAPEUTIC EXERCISES: CPT | Mod: 59,PN

## 2024-04-19 NOTE — PROGRESS NOTES
Physical Therapy Daily Treatment Note     Name: Pamella Ahmadi  Clinic Number: [id number]    Therapy Diagnosis:   Encounter Diagnoses   Name Primary?    Chronic bilateral low back pain without sciatica Yes    Decreased functional mobility and endurance      Physician: Donnell Posadas MD     Visit Date: 4/19/2024    Physician Orders: PT Eval and Treat   Medical Diagnosis from Referral:   R47.1 (ICD-10-CM) - Dysarthria   R29.810 (ICD-10-CM) - Facial droop   I63.412 (ICD-10-CM) - Cerebrovascular accident (CVA) due to embolism of left middle cerebral artery      Evaluation Date: 4/9/2024  Authorization Period Expiration:12/31/24  Plan of Care Expiration: 6/18/24  Visit # / Visits authorized: 3/ 20  FOTO: 2/3  PT progress note due: 30 days from 4/9/24     Precautions: Standard, hx of dizziness. LOB to the R (R vision loss since CVA) and R neglect with using RW at this time so stay SBA to CGA for safety      Time In: 12:00 pm   Time Out: 12:55 pm  Total Time: 55 minutes    SUBJECTIVE     Today, pt reports: she saw her Doctor about her leg swelling issue and they are going to order more tests.  She was compliant with home exercise program.  Response to previous treatment: sore  Functional change: none yet    Pre-Treatment Pain: 5/10    Location: R low back  TREATMENT     Pamella received therapeutic exercises to develop strength and flexibility for 8 minutes including:    Shuttle 4 bands 3 min increase quad motor control  Nustep 5 min    Deferred:  Prone maisha 2x 8 (Ue's fatigue; feels pulling in core)      Pamella received the following manual therapy techniques: Manual traction and Soft tissue Mobilization were applied to the: 0 for 0 minutes, including:    Pamella participated in neuromuscular re-education activities to improve: Balance, Kinesthetic, and Proprioception for 47 minutes. The following activities were included:    Sit to stand 18 in seat with cues on forward momentum 3x 10  Heel raises 2x 10  Single  leg hip flexion (1-2 hands for balance support) 3x 10  Standing hip extension with saucer and 1-2 hands for support 3x 10  Seated LAQ 4# 3x 10 per leg   Dead lifting 15# 3x 5 with MIN A to SBA for safety and cues      Deferred:  Leaning hip extension 2x 10  SLR 2x 10 B increase quad motor control  PEC with plinth elevated  L glute med engaged with plinth elevated 3x 10      Pamella participated in dynamic functional therapeutic activities to improve functional performance for 0  minutes, including:      Pamella participated in gait training to improve functional mobility and safety for 0  minutes, including:    Pamella received the following direct contact modalities after being cleared for contraindications:     Pamella received the following supervised modalities after being cleared for contradictions:     Pamella received hot pack for 0 minutes to 0.    Pamella received cold pack for 0 minutes to 0.  Pamella received electrical stimulation for 0 minutes to 0      Home Exercises Provided and Patient Education Provided     Education/Self-Care provided: (during therex)    Patient educated on the importance of improved core and upper and lower extremity strength in order to improve alignment of the spine and upper and lower extremities with static positions and dynamic movement.   Patient educated on the importance of strong core and lower extremity musculature in order to improve both static and dynamic balance, improve gait mechanics, reduce fall risk and improve household and community mobility.       Written Home Exercises Provided: Patient instructed to cont prior HEP.  Exercises were reviewed and Pamella was able to demonstrate them prior to the end of the session.  Pamella demonstrated good  understanding of the education provided.     See EMR under Patient Instructions for exercises provided 4/16/2024.    ASSESSMENT   Pt tolerated therex with PT focusing on hip control and core support. Pt easily  fatigues but with practice  She was able to stand without a lot of UE and better stand to sit control. PT cued using her hands to push up from the chair vs the walker. PT to advance as tolerated.    Pamella Is progressing well towards her goals.   Pt prognosis is Good.     Pt will continue to benefit from skilled outpatient physical therapy to address the deficits listed in the problem list box on initial evaluation, provide pt/family education and to maximize pt's level of independence in the home and community environment.     Pt's spiritual, cultural and educational needs considered and pt agreeable to plan of care and goals.     Anticipated barriers to physical therapy: multiple strokes    Goals:   Short Term Goals: In 4 weeks   1.I with HEP  2.Pt to increase lumbar ROM to reaching floor without LOB    3.Pt to increase core strength to 2+/5 with core activation.  4.Pt to increase BLE strength to 4+/5 with hip abduction   5.Pt to have pain less than 3/10 or better at all times.  6.Pt to improve score on the FOTO by 10%.  7. Pt to be educated on postural/body mechanics awareness.     Long Term Goals: In 10 weeks 6/18/24  1.Patient to improve score on the FOTO to 61.  2. Patient to demo increase in LE strength to 5/5 with hip abduction  3. Patient to have decreased pain to 2/10 at worst.  4. Patient to demo increase single leg stand to 8-10 sec per leg .  5. Patient to increase dead lifting up to 50 # to manage grandchild care.        Plan   Plan of care Certification: 4/9/2024 to 6/18/24.     Outpatient Physical Therapy 2 times weekly for 10 weeks to include the following interventions: Cervical/Lumbar Traction, Electrical Stimulation IFC, NMES, Gait Training, Manual Therapy, Moist Heat/ Ice, Neuromuscular Re-ed, Patient Education, Self Care, Therapeutic Activities, Therapeutic Exercise, and DN.      Continue Plan of Care (POC) and progress per patient tolerance.    Lidia Wood, PT, CIDN, MA

## 2024-04-19 NOTE — PROGRESS NOTES
[facility]  Speech Therapy Treatment Note- Neurological Rehabilitation  Date: 4/19/2024     Name: Pamella Ahmadi   MRN: [id number]   Therapy Diagnosis:   Encounter Diagnoses   Name Primary?    Aphasia Yes    Dysarthria    Physician: Donnell Posadas MD  Physician Orders: Ambulatory Referral to Speech Therapy   Medical Diagnosis:   R47.1 (ICD-10-CM) - Dysarthria   R29.810 (ICD-10-CM) - Facial droop   I63.412 (ICD-10-CM) - Cerebral infarction due to embolism of left middle cerebral artery       Visit #/ Visits Authorized: 1/ 20  Date of Evaluation:  4/9/2024  Insurance Authorization Period: 4/16/2024 - 12/31/2024  Plan of Care Expiration Date:    7/2/2024  Extended Plan of Care:  NA   Progress Note:  5/17/2024    Time In:  1:02 PM   Time Out:  1:42 PM   Total Billable Time: 40 minutes      Precautions: Standard and Fall  Subjective:   Patient reports: Her speech worsens when she is tired.    She was compliant to home exercise program.   Response to previous treatment: first treatment session today  Pain Scale: no pain indicated throughout session  Objective:           UNTIMED  Procedure   Speech- Language- Voice Therapy      Short Term Goals: (6 weeks) Current Progress:   1. Patient will complete reading/writing assessment.      Progressing/ Not Met 4/19/2024  Completed today. See below.    2. Patient will use Semantic Feature Analysis to describe objects with 90% accuracy and will self-monitor verbal output of message with moderate clinician redirection.      Progressing/ Not Met 4/19/2024  Introduced today. Patient able to complete SFA charts with 90% accuracy independently. She reports continued difficulty with writing.    3.  Patient will utilize word finding strategies in structured tasks with 90% accuracy and minimal cues.      Progressing/ Not Met 4/19/2024   Circumlocution strategy discussed today.     4. Patient will achieve 90% accuracy on oral expression while reading sentences out loud in  jayme with use of the Oral Reading for Language in Aphasia Protocol (DREA).      Progressing/ Not Met 2024  DREA Level 3 completed today with 100% accuracy independently.    5.  Patient will summarize read material to improve word fluency, word finding, and reading comprehension in Attentive Reading and Constrained Summarization (ARCS) protocol with 80% accuracy and minimal verbal assistance across 2-3 sessions.     Progressing/ Not Met 2024  Completed with four paragraph article today. Patient able to summarize read material with 80% accuracy and minimal cueing. She was asked to complete written summary as part of her HEP.      Western Aphasia Battery - Revised (WAB-R) - Supplemental was administered to evaluate the patient's reading and writing function.The following results were revealed:   Readin            Writin.5   Reading    Comprehension of Sentences: 40 /40   Reading Commands 6 /6    Writing    Writing Upon Request     Writing Output 34  / 34   Writing to Dictation 9.5 /10      Patient Education/Response:   Patient educated regarding the followin. SFA, DREA, and ARCS protocol  2. Dysarthria strategies for when she is tired  3. Word finding strategies    Home program established: yes-Patient instructed to complete ARCS (written summary).   Patient verbalized understanding to all above education provided.     See Electronic Medical Record under Patient Instructions for exercises provided throughout therapy.  Assessment:   Patient is receptive to all education provided and participates in all therapy tasks. She endorses worsening dysarthria when she is tired. Dysarthria goals may be added pending patient report next week.     Pamella is progressing well towards her goals. Current goals remain appropriate. Goals to be updated as necessary.     Patient prognosis is Excellent. Patient will continue to benefit from skilled outpatient speech and language therapy to address the  deficits listed in the problem list on initial evaluation, provide patient/family education and to maximize patient's level of independence in the home and community environment.   Medical necessity is demonstrated by the following IMPAIRMENTS:  Aphasia impacts ability to communicate medical/urgent needs and participate in social and community interactions   Barriers to Therapy: none  Patient's spiritual, cultural and educational needs considered and patient agreeable to plan of care and goals.  Plan:   Continue Plan of Care with focus on rehabilitation and compensation for aphasia.     Mercy Pang, CCC-SLP, CBIS   Speech Language Pathologist   Certified Brain Injury Specialist   4/19/2024

## 2024-04-24 ENCOUNTER — CLINICAL SUPPORT (OUTPATIENT)
Dept: REHABILITATION | Facility: HOSPITAL | Age: 74
End: 2024-04-24
Payer: MEDICARE

## 2024-04-24 DIAGNOSIS — R29.898 WEAKNESS OF BOTH LOWER EXTREMITIES: Primary | ICD-10-CM

## 2024-04-24 DIAGNOSIS — G89.29 CHRONIC BILATERAL LOW BACK PAIN WITHOUT SCIATICA: Primary | ICD-10-CM

## 2024-04-24 DIAGNOSIS — R27.8 DECREASED COORDINATION: ICD-10-CM

## 2024-04-24 DIAGNOSIS — Z74.09 DECREASED FUNCTIONAL MOBILITY AND ENDURANCE: ICD-10-CM

## 2024-04-24 DIAGNOSIS — M54.50 CHRONIC BILATERAL LOW BACK PAIN WITHOUT SCIATICA: Primary | ICD-10-CM

## 2024-04-24 PROCEDURE — 97112 NEUROMUSCULAR REEDUCATION: CPT | Mod: PN

## 2024-04-24 PROCEDURE — 97530 THERAPEUTIC ACTIVITIES: CPT | Mod: PN

## 2024-04-24 NOTE — PROGRESS NOTES
Physical Therapy Daily Treatment Note     Name: Pamella Ahmadi  Clinic Number: [id number]    Therapy Diagnosis:   Encounter Diagnoses   Name Primary?    Chronic bilateral low back pain without sciatica Yes    Decreased functional mobility and endurance      Physician: Donnell Posadas MD     Visit Date: 4/24/2024    Physician Orders: PT Eval and Treat   Medical Diagnosis from Referral:   R47.1 (ICD-10-CM) - Dysarthria   R29.810 (ICD-10-CM) - Facial droop   I63.412 (ICD-10-CM) - Cerebrovascular accident (CVA) due to embolism of left middle cerebral artery      Evaluation Date: 4/9/2024  Authorization Period Expiration:12/31/24  Plan of Care Expiration: 6/18/24  Visit # / Visits authorized: 4/ 20  FOTO: 2/3  PT progress note due: 30 days from 4/9/24     Precautions: Standard, hx of dizziness. LOB to the R (R vision loss since CVA) and R neglect with using RW at this time so stay SBA to CGA for safety      Time In: 11:04 am   Time Out: 11:49 am  Total Time: 45 minutes  OT assessed BP and it was under 150/100 mmHg    SUBJECTIVE     Today, pt reports: she woke up shaky and dizzy. She took her medication as usual and thought she       She was compliant with home exercise program.  Response to previous treatment: sore  Functional change: none yet    Pre-Treatment Pain: 5/10    Location: R low back      OBJECTIVE     Having trouble keeping the R eye closed while opening the L eye. PT assessed motor control at the eyes to help with gaze stabilization today but did not perform Mikaela Morales as she was very nauseated already.  Pt is having diploplia with close vision with objects within 8 inches or closer (measured from chin)  Pt states it feels like she sees a shadow around the objects she looks at.  Tight suboccipitals B      TREATMENT     Pamella received therapeutic exercises to develop strength and flexibility for 0 minutes including:    Deferred:  Shuttle 4 bands 3 min increase quad motor control  Nustep 5  min    Deferred:  Prone maisha 2x 8 (Ue's fatigue; feels pulling in core)      Pamella received the following manual therapy techniques: Manual traction and Soft tissue Mobilization were applied to the: 0 for 0 minutes, including:    Pamella participated in neuromuscular re-education activities to improve: Balance, Kinesthetic, and Proprioception for 45 minutes. The following activities were included:    L eye opening with R hand to assist keeping the R eye closed 2x 15  R eye open/close 2x 10  Seated gaze stabilization R eye only; L eye only 2x 10 each on PT finger  Seated gaze stabilization with eye chart letter K eyes open to eyes closed (B) 2 x10  Seated R gaze stabilization with eye chart letter K eyes open to eyes closed 3x 10 (pt states vision slowly began to get less fatigued and more focused then fatigues) hold gaze for 3 sec then close eye  Seated R eye gaze stabilization with eye chart from letter K in middle to R middle 3x 10   Eyes open cervical chin tucks in supine with towel roll 1x 20; eyes closed 1x 20  Supine chin tuck with retraction 1x 20     Deferred:  Sit to stand 18 in seat with cues on forward momentum 3x 10  Heel raises 2x 10  Single leg hip flexion (1-2 hands for balance support) 3x 10  Standing hip extension with saucer and 1-2 hands for support 3x 10  Seated LAQ 4# 3x 10 per leg   Dead lifting 15# 3x 5 with MIN A to SBA for safety and cues  Leaning hip extension 2x 10  SLR 2x 10 B increase quad motor control  PEC with plinth elevated  L glute med engaged with plinth elevated 3x 10      Pamella participated in dynamic functional therapeutic activities to improve functional performance for 0  minutes, including:      Pamella participated in gait training to improve functional mobility and safety for 0  minutes, including:    Pamella received the following direct contact modalities after being cleared for contraindications:     Pamella received the following supervised modalities  after being cleared for contradictions:     Pamella received hot pack for 0 minutes to 0.    Pamella received cold pack for 0 minutes to 0.  Pamella received electrical stimulation for 0 minutes to 0      Home Exercises Provided and Patient Education Provided     Education/Self-Care provided: (during therex)    Patient educated on the importance of improved core and upper and lower extremity strength in order to improve alignment of the spine and upper and lower extremities with static positions and dynamic movement.   Patient educated on the importance of strong core and lower extremity musculature in order to improve both static and dynamic balance, improve gait mechanics, reduce fall risk and improve household and community mobility.       Written Home Exercises Provided: Patient instructed to cont prior HEP.  Exercises were reviewed and Pamella was able to demonstrate them prior to the end of the session.  Pamella demonstrated good  understanding of the education provided.     See EMR under Patient Instructions for exercises provided 4/16/2024.    ASSESSMENT   Pt arrived very nauseated and tired. She felt she was shaking but PT couldn't observe this. BP was stable but mildly elevated diastolically (under 100 though). PT assessed complains of headache and dizziness and reduced lighting and began motor control work of the eye lids and eyes as well as cervical muscles. Less headache pain after cervical therex today. Pt may need to see our vestibular specialist as the R eye appears to be fatiguing from the changes in vision resulting in her using peripheral and not motor control of the R eye for gaze stabilization.  Pt encouraged to rest her brain today as she may be getting overstimulated as well with ocular motor challenges as the R eye is trying to learn and see from it's limited visual field. PT to advance as tolerated.    Pamella Is progressing well towards her goals.   Pt prognosis is Good.     Pt will  continue to benefit from skilled outpatient physical therapy to address the deficits listed in the problem list box on initial evaluation, provide pt/family education and to maximize pt's level of independence in the home and community environment.     Pt's spiritual, cultural and educational needs considered and pt agreeable to plan of care and goals.     Anticipated barriers to physical therapy: multiple strokes    Goals:   Short Term Goals: In 4 weeks   1.I with HEP. met  2.Pt to increase lumbar ROM to reaching floor without LOB. progressing  3.Pt to increase core strength to 2+/5 with core activation.  4.Pt to increase BLE strength to 4+/5 with hip abduction   5.Pt to have pain less than 3/10 or better at all times.  6.Pt to improve score on the FOTO by 10%.  7. Pt to be educated on postural/body mechanics awareness.     Long Term Goals: In 10 weeks 6/18/24  1.Patient to improve score on the FOTO to 61.  2. Patient to demo increase in LE strength to 5/5 with hip abduction  3. Patient to have decreased pain to 2/10 at worst.  4. Patient to demo increase single leg stand to 8-10 sec per leg .  5. Patient to increase dead lifting up to 50 # to manage grandchild care.        Plan   Plan of care Certification: 4/9/2024 to 6/18/24.     Outpatient Physical Therapy 2 times weekly for 10 weeks to include the following interventions: Cervical/Lumbar Traction, Electrical Stimulation IFC, NMES, Gait Training, Manual Therapy, Moist Heat/ Ice, Neuromuscular Re-ed, Patient Education, Self Care, Therapeutic Activities, Therapeutic Exercise, and DN.      Continue Plan of Care (POC) and progress per patient tolerance.    Lidia Wood, PT, CIDN, SFMA

## 2024-04-24 NOTE — PROGRESS NOTES
"  [facility]  Occupational Therapy Treatment Note     Date: 4/24/2024  Name: Pamella Ahmadi  Clinic Number: [id number]    Therapy Diagnosis:   Encounter Diagnoses   Name Primary?    Weakness of both lower extremities Yes    Decreased coordination      Physician: Donnell Posadas MD    Physician Orders: OT eval and tx  Medical Diagnosis: Dysarthria [R47.1], Facial droop [R29.810], Cerebrovascular accident (CVA) due to embolism of left middle cerebral artery [I63.412]   Evaluation Date: 4/9/2024  Insurance Authorization Period Expiration: 9/29/2025  Plan of Care Certification Period: 4/9/2024 to 6/7/2024  Progress Note Due: 30 days (or 5/9/2024)      Visit # / Visits authorized: 2/20  FOTO: 1/3  Date of Return to MD: PRN     Precautions: HTN; implanted cardiac monitor (ICM); pre-diabetic    Time In: 1015  Time Out: 1100  Total Billable Time: 45 minutes    Subjective     Pt reports: "I think I overdid it yesterday with my HEP for physical therapy. I'm sore, nauseated, and dizzy. I just feel off and not myself today. And I think my right eye is getting worse."    she was compliant with home exercise program given on evaluation.  Response to previous treatment: fair  Functional change: none new on this date    Pain: 0/10 (none on evaluation)  Location: RUE    Objective   Objective measures updated at progress report unless specified.    BP: 149/91  HR: 75    Treatment     Pamella received the treatments listed below:      therapeutic exercises to develop strength and ROM for 5 minutes including:  - composite  strengthening with Progressive Hand Exerciser (3rd position) 5x10    neuromuscular re-education activities to improve Coordination and Proprioception for 0 minutes including:  - NT    therapeutic activities to improve functional performance of RUE for 40 minutes including:  - red putty exercises: gripping, pinching, and finding/removing beads  - practiced handwriting    direct " contact modalities after being cleared for contraindications for 0 minutes including:  - NT    supervised modalities after being cleared for contradictions for 0 minutes including:  - NT    Home Exercises and Education Provided     Education provided:   - progress toward goals    Written Home Exercises Provided: Patient instructed to cont prior HEP  Exercises were reviewed and Pamella was able to demonstrate them prior to the end of the session. Pamella demonstrated good understanding of the HEP provided.     See EMR under Patient Instructions for exercises provided during prior visit.        Assessment     Pamella was seen for her 2nd tx session on this date. Pt expressed nausea, concern, and stress (mainly regarding her right eye) during session, causing her to become anxious and tearful. Limited tolerance for overall strengthening and larger, dynamic movements on this date. Worked at table level for tolerance.    Pamella is progressing towards her goals and there are no updates to goals at this time. Pt prognosis is Good.     Pamella will continue to benefit from skilled outpatient occupational therapy services to address the deficits listed in the problem list on initial evaluation, to provide pt/family education, and to maximize pt's level of independence in the home and community environment.     Pt's spiritual, cultural and educational needs considered and pt agreeable to plan of care and goals.    Anticipated barriers to occupational therapy: none    Goals:  Short Term Goals: 4 weeks   Pt will be independent with HEP. - MET 4/16/2024  Pt will tolerate right upper extremity strengthening and endurance activities.  Pt will report increased legibility of handwriting. - MET 4/16/2024  Pt will report an increase in FOTO intake score of > 62%, which would indicate an improvement in quality of life.     Long Term Goals: 8 weeks   Pt will exhibit right shoulder MMT/strength of 5/5 needed for picking up  grandchildren.  Pt will exhibit improved coordination, control, and legibility with all handwriting tasks.  Pt will report an increase in FOTO intake score of > 68%, which would indicate an improvement in quality of life.    Plan     Certification Period/Plan of care expiration: 4/9/2024 to 6/7/2024     Outpatient Occupational Therapy 1-2 times weekly for 8 weeks to include the following interventions: Manual Therapy, Moist Heat/ Ice, Neuromuscular Re-ed, Patient Education, Self Care, Therapeutic Activities, and Therapeutic Exercise    Updates/Grading for next session: progress occupational therapy as tolerated    VIVIENNE ZUNIGA OT

## 2024-05-01 ENCOUNTER — CLINICAL SUPPORT (OUTPATIENT)
Dept: REHABILITATION | Facility: HOSPITAL | Age: 74
End: 2024-05-01
Payer: MEDICARE

## 2024-05-01 DIAGNOSIS — Z74.09 DECREASED FUNCTIONAL MOBILITY AND ENDURANCE: ICD-10-CM

## 2024-05-01 DIAGNOSIS — M54.50 CHRONIC BILATERAL LOW BACK PAIN WITHOUT SCIATICA: Primary | ICD-10-CM

## 2024-05-01 DIAGNOSIS — R29.898 WEAKNESS OF BOTH LOWER EXTREMITIES: Primary | ICD-10-CM

## 2024-05-01 DIAGNOSIS — R27.8 DECREASED COORDINATION: ICD-10-CM

## 2024-05-01 DIAGNOSIS — G89.29 CHRONIC BILATERAL LOW BACK PAIN WITHOUT SCIATICA: Primary | ICD-10-CM

## 2024-05-01 PROCEDURE — 97530 THERAPEUTIC ACTIVITIES: CPT | Mod: PN

## 2024-05-01 PROCEDURE — 97110 THERAPEUTIC EXERCISES: CPT | Mod: PN

## 2024-05-01 PROCEDURE — 97112 NEUROMUSCULAR REEDUCATION: CPT | Mod: PN

## 2024-05-01 NOTE — PROGRESS NOTES
"  [facility]  Occupational Therapy Treatment Note     Date: 5/1/2024  Name: Pamella Ahmadi  Clinic Number: [id number]    Therapy Diagnosis:   Encounter Diagnoses   Name Primary?    Weakness of both lower extremities Yes    Decreased coordination        Physician: Donnell Posadas MD    Physician Orders: OT eval and tx  Medical Diagnosis: Dysarthria [R47.1], Facial droop [R29.810], Cerebrovascular accident (CVA) due to embolism of left middle cerebral artery [I63.412]   Evaluation Date: 4/9/2024  Insurance Authorization Period Expiration: 9/29/2025  Plan of Care Certification Period: 4/9/2024 to 6/7/2024  Progress Note Due: 30 days (or 5/9/2024)      Visit # / Visits authorized: 3/20  FOTO: 1/3  Date of Return to MD: PRN     Precautions: HTN; implanted cardiac monitor (ICM); pre-diabetic    Time In: 11:45 am   Time Out: 12:30 pm   Total Billable Time: 45 minutes    Subjective     Pt reports: "I am feeling much better. I am back to my normal self."     she was compliant with home exercise program given on evaluation.  Response to previous treatment: fair  Functional change: none new on this date    Pain: 0/10 (none on evaluation)  Location: RUE    Objective   Objective measures updated at progress report unless specified.    Treatment     Pamella received the treatments listed below:      therapeutic exercises to develop strength and ROM for 25 minutes including:  - UBE x 6 min  - chest press with 3# dowel 2x10  - overhead press with 3# dowel 2x10  - dowel raises with 3 # 2 x 10   - composite  strengthening with Progressive Hand Exerciser (3rd position) 5x10  - shoulder rolls, elevation, retraction 1 set of 10     neuromuscular re-education activities to improve Coordination and Proprioception for 0 minutes including:  - NT    therapeutic activities to improve functional performance of RUE for  20 minutes including:  - red putty exercises: gripping, pinching, and finding/removing " hang    direct contact modalities after being cleared for contraindications for 0 minutes including:  - NT    supervised modalities after being cleared for contradictions for 0 minutes including:  - NT    Home Exercises and Education Provided     Education provided:   - progress toward goals    Written Home Exercises Provided: Patient instructed to cont prior HEP  Exercises were reviewed and Pamella was able to demonstrate them prior to the end of the session. Pamella demonstrated good understanding of the HEP provided.     See EMR under Patient Instructions for exercises provided during prior visit.        Assessment     Pamella was seen for her 3rd treatment visit on this date and displayed increased ability to perform all tasks required of her. The focus of treatment targeted B UE movement. With improved tolerance for overall strengthening and larger, dynamic movements on this date. She did require increased time for fine motor tasks to be completed independently.    Pamella is progressing towards her goals and there are no updates to goals at this time. Pt prognosis is Good.     Pamella will continue to benefit from skilled outpatient occupational therapy services to address the deficits listed in the problem list on initial evaluation, to provide pt/family education, and to maximize pt's level of independence in the home and community environment.     Pt's spiritual, cultural and educational needs considered and pt agreeable to plan of care and goals.    Anticipated barriers to occupational therapy: none    Goals:  Short Term Goals: 4 weeks   Pt will be independent with HEP. - MET 4/16/2024  Pt will tolerate right upper extremity strengthening and endurance activities.  Pt will report increased legibility of handwriting. - MET 4/16/2024  Pt will report an increase in FOTO intake score of > 62%, which would indicate an improvement in quality of life.     Long Term Goals: 8 weeks   Pt will exhibit right  shoulder MMT/strength of 5/5 needed for picking up grandchildren.  Pt will exhibit improved coordination, control, and legibility with all handwriting tasks.  Pt will report an increase in FOTO intake score of > 68%, which would indicate an improvement in quality of life.    Plan     Certification Period/Plan of care expiration: 4/9/2024 to 6/7/2024     Outpatient Occupational Therapy 1-2 times weekly for 8 weeks to include the following interventions: Manual Therapy, Moist Heat/ Ice, Neuromuscular Re-ed, Patient Education, Self Care, Therapeutic Activities, and Therapeutic Exercise    Updates/Grading for next session: progress occupational therapy as tolerated    Christa Tafoya, OT

## 2024-05-01 NOTE — PROGRESS NOTES
Physical Therapy Daily Treatment Note     Name: Pamella Ahmadi  Clinic Number: [id number]    Therapy Diagnosis:   Encounter Diagnoses   Name Primary?    Chronic bilateral low back pain without sciatica Yes    Decreased functional mobility and endurance      Physician: Donnell Posadas MD     Visit Date: 5/1/2024    Physician Orders: PT Eval and Treat   Medical Diagnosis from Referral:   R47.1 (ICD-10-CM) - Dysarthria   R29.810 (ICD-10-CM) - Facial droop   I63.412 (ICD-10-CM) - Cerebrovascular accident (CVA) due to embolism of left middle cerebral artery      Evaluation Date: 4/9/2024  Authorization Period Expiration:12/31/24  Plan of Care Expiration: 6/18/24  Visit # / Visits authorized: 5/ 20  FOTO: 2/3  PT progress note due: 30 days from 4/9/24     Precautions: Standard, hx of dizziness. LOB to the R (R vision loss since CVA) and R neglect with using RW at this time so stay SBA to CGA for safety      Time In: 10:08 am   Time Out: 11:02 am  Total Time: 54 minutes    SUBJECTIVE     Today, pt reports: feeling better today as less nausea and dizziness but some after prolonged supine so PT to set her up with vestibular therapist to assess central system more in a few weeks.    She was compliant with home exercise program.  Response to previous treatment: sore  Functional change: none yet    Pre-Treatment Pain: 3/10    Location: R low back      OBJECTIVE         TREATMENT     Pamella received therapeutic exercises to develop strength and flexibility for 9 minutes including:    Sit to stand 3x 10   Posterior pelvic tilt for lumbar ROM 2x 20  Supine chin tuck with flexion 1x15    Deferred:  Shuttle 4 bands 3 min increase quad motor control  Nustep 5 min  Prone maisha 2x 8 (Ue's fatigue; feels pulling in core)      Pamella received the following manual therapy techniques: Manual traction and Soft tissue Mobilization were applied to the: 0 for 0 minutes, including:    Pamella participated in neuromuscular re-education  activities to improve: Balance, Kinesthetic, and Proprioception for 45 minutes. The following activities were included:    PPT with single leg hold 3x 10 sec  PPT with Double leg hold 6x 10 sec   Hook lying hip adduction 3x 10  L side lying hip internal rotation (often R falls out into ER when in supine) ball at knees (RTB at ankles) 3x 10  Leaning hip extension 2x 10 1#  SLR 3x 10 B increase quad motor control 1#  Supine chin tuck with retraction 1x 15    Deferred:  Single leg hip flexion (1-2 hands for balance support) 3x 10  Standing hip extension with saucer and 1-2 hands for support 3x 10  Seated LAQ 4# 3x 10 per leg   Dead lifting 15# 3x 5 with MIN A to SBA for safety and cues  PEC with plinth elevated  L glute med engaged with plinth elevated 3x 10      Pamella participated in dynamic functional therapeutic activities to improve functional performance for 0  minutes, including:      Pamella participated in gait training to improve functional mobility and safety for 0  minutes, including:    Pamella received the following direct contact modalities after being cleared for contraindications:     Pamella received the following supervised modalities after being cleared for contradictions:     Pamella received hot pack for 0 minutes to 0.    Pamella received cold pack for 0 minutes to 0.  Pamella received electrical stimulation for 0 minutes to 0      Home Exercises Provided and Patient Education Provided     Education/Self-Care provided: (during therex)    Patient educated on the importance of improved core and upper and lower extremity strength in order to improve alignment of the spine and upper and lower extremities with static positions and dynamic movement.   Patient educated on the importance of strong core and lower extremity musculature in order to improve both static and dynamic balance, improve gait mechanics, reduce fall risk and improve household and community mobility.       Written Home  Exercises Provided: Patient instructed to cont prior HEP.  Exercises were reviewed and Pamella was able to demonstrate them prior to the end of the session.  Pamella demonstrated good  understanding of the education provided.     See EMR under Patient Instructions for exercises provided 4/16/2024.    ASSESSMENT   Pt arrived with no reports of nausea or fatigue. PT continued with therex to advance as tolerated. Pt was able to tolerate some increase in load but supine prolonged had the response of some dizziness that remained. Habituation may be needed but it is hard to tell at times as vision has changed on R so much that this alone can cause some nausea/dizziness.     Pamella Is progressing well towards her goals.   Pt prognosis is Good.     Pt will continue to benefit from skilled outpatient physical therapy to address the deficits listed in the problem list box on initial evaluation, provide pt/family education and to maximize pt's level of independence in the home and community environment.     Pt's spiritual, cultural and educational needs considered and pt agreeable to plan of care and goals.     Anticipated barriers to physical therapy: multiple strokes    Goals:   Short Term Goals: In 4 weeks   1.I with HEP. met  2.Pt to increase lumbar ROM to reaching floor without LOB. progressing  3.Pt to increase core strength to 2+/5 with core activation. progressing  4.Pt to increase BLE strength to 4+/5 with hip abduction. progressing  5.Pt to have pain less than 3/10 or better at all times.  6.Pt to improve score on the FOTO by 10%.  7. Pt to be educated on postural/body mechanics awareness.     Long Term Goals: In 10 weeks 6/18/24  1.Patient to improve score on the FOTO to 61.  2. Patient to demo increase in LE strength to 5/5 with hip abduction  3. Patient to have decreased pain to 2/10 at worst.  4. Patient to demo increase single leg stand to 8-10 sec per leg .  5. Patient to increase dead lifting up to 50 # to  manage grandchild care.        Plan   Plan of care Certification: 4/9/2024 to 6/18/24.     Outpatient Physical Therapy 2 times weekly for 10 weeks to include the following interventions: Cervical/Lumbar Traction, Electrical Stimulation IFC, NMES, Gait Training, Manual Therapy, Moist Heat/ Ice, Neuromuscular Re-ed, Patient Education, Self Care, Therapeutic Activities, Therapeutic Exercise, and DN.      Continue Plan of Care (POC) and progress per patient tolerance.    Lidia Wood, PT, CIDN, SFMA

## 2024-05-03 ENCOUNTER — CLINICAL SUPPORT (OUTPATIENT)
Dept: REHABILITATION | Facility: HOSPITAL | Age: 74
End: 2024-05-03
Payer: MEDICARE

## 2024-05-03 DIAGNOSIS — R47.1 DYSARTHRIA: ICD-10-CM

## 2024-05-03 DIAGNOSIS — G89.29 CHRONIC BILATERAL LOW BACK PAIN WITHOUT SCIATICA: Primary | ICD-10-CM

## 2024-05-03 DIAGNOSIS — Z74.09 DECREASED FUNCTIONAL MOBILITY AND ENDURANCE: ICD-10-CM

## 2024-05-03 DIAGNOSIS — R47.01 APHASIA: Primary | ICD-10-CM

## 2024-05-03 DIAGNOSIS — M54.50 CHRONIC BILATERAL LOW BACK PAIN WITHOUT SCIATICA: Primary | ICD-10-CM

## 2024-05-03 PROCEDURE — 92507 TX SP LANG VOICE COMM INDIV: CPT | Mod: PN

## 2024-05-03 PROCEDURE — 97140 MANUAL THERAPY 1/> REGIONS: CPT | Mod: PN

## 2024-05-03 PROCEDURE — 97112 NEUROMUSCULAR REEDUCATION: CPT | Mod: PN,59

## 2024-05-03 PROCEDURE — 97110 THERAPEUTIC EXERCISES: CPT | Mod: PN

## 2024-05-03 NOTE — PROGRESS NOTES
[facility]  Speech Therapy Treatment Note- Neurological Rehabilitation  Date: 5/3/2024     Name: Pamella Ahmadi   MRN: [id number]   Therapy Diagnosis:   Encounter Diagnoses   Name Primary?    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    She was compliant to home exercise program.   Response to previous treatment: good - two goals met today.   Pain Scale: no pain indicated throughout session  Objective:           UNTIMED  Procedure   Speech- Language- Voice Therapy      Short Term Goals: (6 weeks) Current Progress:   1. Patient will complete reading/writing assessment.      GOAL MET 4/19/2024.  GOAL MET. Completed 4/19/2024.    2. Patient will use Semantic Feature Analysis to describe objects with 90% accuracy and will self-monitor verbal output of message with moderate clinician redirection.            GOAL MET 5/3/2024 Introduced today. Patient able to complete SFA charts with 100% accuracy independently. She reports continued difficulty with writing from a fine motor standpoint.     GOAL MET 5/3/2024   3.  Patient will utilize word finding strategies in structured tasks with 90% accuracy and minimal cues.        Progressing/ Not Met 5/3/2024   Circumlocution strategy discussed again today. No significant word finding deficits noted during  conversational tasks today.    4. Patient will achieve 90% accuracy on oral expression while reading sentences out loud in unison with use of the Oral Reading for Language in Aphasia Protocol (DREA).      GOAL MET 5/3/2024 DREA Level 3 completed today with 100% accuracy independently.         GOAL MET 5/3/2024   5.  Patient will summarize read material to improve word fluency, word finding, and reading comprehension in Attentive Reading and Constrained Summarization (ARCS) protocol with 80% accuracy and minimal verbal assistance across 2-3 sessions.     Progressing/ Not Met 5/3/2024  Completed with six paragraph article today. Patient able to summarize read material with 80% accuracy and minimal cueing. She was asked to complete written summary as part of her HEP.      Patient endorsed some memory concerns. To assess, the RBANS was administered today. See below for results:     The Repeatable Battery for the Assessment of Neuropsychological Status (RBANS) Version B was administered. The RBANS is a brief, individually administered battery to measure cognitive decline or improvement. It covers five domains including immediate memory, visuospatial/constructional, language, attention, and delayed memory. The results are outlined below:    Domain Subtest Total Score Index Score Percentile Qualitative Description   Immediate Memory List Learning 23   90   25%   Average    Story Memory 16      Visuospatial/  Constructional Figure Copy 16   72   3%   Borderline    Line Orientation 11      Language Picture Naming 10   99   47%   Average    Semantic Fluency 19      Attention Digit Span 8   82   12%   Low Average    Coding 29        Delayed Memory List Recall 6     110     75%     High Average    List Recognition 20       Story Recall 9       Figure Recall 15          Total Scale   86     Percentile   18%     Descriptor   Low Average   Interpretation:  Scaled score: mean of 10, standard deviation of 3. Therefore, 16+ = Very  Superior; 14-15 = Superior; 12-13 = High Average; 8-11 = Average; 6-7 = Low Average; 4-5 = Borderline; 3 and below = Extremely Low    Index score: mean of 100, standard deviation of 15. Therefore, 130+ = Very Superior; 120-129 = Superior; 110-119 = High average;  = Average; 80-89 = Low Average; 70-79 = Borderline; 69 and below = Extremely Low. Apparently the most reliable score; factor in education level.    Overall, according to the RBANS research, total Scale index is a good indicator of general cognitive functioning. The patient presents with cognitive communication functions within normal limits.  She presented with attention, immediate memory, delayed memory and language skills also considered low average, average, or high average. Some visuospatial deficits appreciated, however patient has baseline visual deficits. Visual deficits likely impacted score and this does not necessarily represent a true neurological visuospatial deficit.   Patient Education/Response:   Patient educated regarding the followin. SFA, DREA, and ARCS protocol  2. Dysarthria strategies for when she is tired  3. Word finding strategies    Home program established: yes-Patient instructed to complete ARCS (written summary).   Patient verbalized understanding to all above education provided.     See Electronic Medical Record under Patient Instructions for exercises provided throughout therapy.  Assessment:   Patient is receptive to all education provided and participates in all therapy tasks. She endorsed some memory concerns although reported these were present prior to her cerebral vascular accident. To address concerns, the RBANS was administered and patient was appreciated to have cognitive-communication functions within normal limits at this time. Given resolution of word finding deficits, patient and clinician discussed potential discharge from . She verbalized understanding.     Pamella is progressing well towards her  goals. Current goals remain appropriate. Goals to be updated as necessary.     Patient prognosis is Excellent. Patient will continue to benefit from skilled outpatient speech and language therapy to address the deficits listed in the problem list on initial evaluation, provide patient/family education and to maximize patient's level of independence in the home and community environment.   Medical necessity is demonstrated by the following IMPAIRMENTS:  Aphasia impacts ability to communicate medical/urgent needs and participate in social and community interactions   Barriers to Therapy: none  Patient's spiritual, cultural and educational needs considered and patient agreeable to plan of care and goals.  Plan:   Continue Plan of Care with focus on rehabilitation and compensation for aphasia.     Mercy Pang, CCC-SLP, CBIS   Speech Language Pathologist   Certified Brain Injury Specialist   5/3/2024

## 2024-05-03 NOTE — PROGRESS NOTES
Physical Therapy Daily Treatment Note     Name: Pamella Ahmadi  Clinic Number: [id number]    Therapy Diagnosis:   Encounter Diagnoses   Name Primary?    Chronic bilateral low back pain without sciatica Yes    Decreased functional mobility and endurance        Physician: Donnell Posadas MD     Visit Date: 5/3/2024    Physician Orders: PT Eval and Treat   Medical Diagnosis from Referral:   R47.1 (ICD-10-CM) - Dysarthria   R29.810 (ICD-10-CM) - Facial droop   I63.412 (ICD-10-CM) - Cerebrovascular accident (CVA) due to embolism of left middle cerebral artery      Evaluation Date: 4/9/2024  Authorization Period Expiration:12/31/24  Plan of Care Expiration: 6/18/24  Visit # / Visits authorized: 6/ 20  FOTO: 2/3  PT progress note due: 30 days from 4/9/24     Precautions: Standard, hx of dizziness. LOB to the R (R vision loss since CVA) and R neglect with using RW at this time so stay SBA to CGA for safety      Time In: 12:08 pm   Time Out: 1:02 pm  Total Billable Time: 56 minutes    SUBJECTIVE     Today, pt reports: very sore after a lot of lifting in her OT session so her back and neck are hurting.    She was compliant with home exercise program.  Response to previous treatment: sore  Functional change: none yet    Pre-Treatment Pain: 4/10    Location: R low back and neck and upper back      OBJECTIVE         TREATMENT     Pamella received therapeutic exercises to develop strength and flexibility for 11 minutes including:    After manual therapy:  Lower trunk rotations 3x 20  Posterior pelvic tilt for lumbar ROM 3x 20  Supine chin tuck with flexion 3x15    Deferred:  Sit to stand 3x 10   Shuttle 4 bands 3 min increase quad motor control  Nustep 5 min  Prone maisha 2x 8 (Ue's fatigue; feels pulling in core)      Pamella received the following manual therapy techniques: Manual traction and Soft tissue Mobilization were applied to the: upper and mid and low back for 15 minutes, including:    Soft tissue mobs to the  lumbar to cervical paraspinals  Scapular mobs for depression B      Pamella participated in neuromuscular re-education activities to improve: Balance, Kinesthetic, and Proprioception for 30 minutes. The following activities were included:    Supine chin tuck with retraction 1x 15  PPT with single leg hold 2x10 x 10 sec  PPT with Double leg hold 6x 10 sec   Core activation 3x 10  Core activation with heel raises 3x 10  Sit to stand without UE support to challenge balance 18 inch seat 1x10    Deferred:  Hook lying hip adduction 3x 10  L side lying hip internal rotation (often R falls out into ER when in supine) ball at knees (RTB at ankles) 3x 10  Leaning hip extension 2x 10 1#  SLR 3x 10 B increase quad motor control 1#  Single leg hip flexion (1-2 hands for balance support) 3x 10  Standing hip extension with saucer and 1-2 hands for support 3x 10  Seated LAQ 4# 3x 10 per leg   Dead lifting 15# 3x 5 with MIN A to SBA for safety and cues  PEC with plinth elevated  L glute med engaged with plinth elevated 3x 10      Pamella participated in dynamic functional therapeutic activities to improve functional performance for 0  minutes, including:      Pamella participated in gait training to improve functional mobility and safety for 0  minutes, including:    Pamella received the following direct contact modalities after being cleared for contraindications:     Pamella received the following supervised modalities after being cleared for contradictions:     Pamella received hot pack for 0 minutes to 0.    Pamella received cold pack for 0 minutes to 0.  Pamella received electrical stimulation for 0 minutes to 0      Home Exercises Provided and Patient Education Provided     Education/Self-Care provided: (during therex)    Patient educated on the importance of improved core and upper and lower extremity strength in order to improve alignment of the spine and upper and lower extremities with static positions and dynamic  movement.   Patient educated on the importance of strong core and lower extremity musculature in order to improve both static and dynamic balance, improve gait mechanics, reduce fall risk and improve household and community mobility.       Written Home Exercises Provided: Patient instructed to cont prior HEP.  Exercises were reviewed and Pamella was able to demonstrate them prior to the end of the session.  Pamella demonstrated good  understanding of the education provided.     See EMR under Patient Instructions for exercises provided 4/16/2024.    ASSESSMENT   Pt arrived with no reports of nausea or fatigue but very sore from the last but much less after manual therapy assistance today. PT provided her with core work to follow to help mobilize the tissue  Pt had no dizziness and transferred better today. PT used a lot of cues to reduce her tendency to hold her breath so she needed cues for pacing to increase safety. Pt scheduled to see vestibular specialist in a few sessions.       Pamella Is progressing well towards her goals.   Pt prognosis is Good.     Pt will continue to benefit from skilled outpatient physical therapy to address the deficits listed in the problem list box on initial evaluation, provide pt/family education and to maximize pt's level of independence in the home and community environment.     Pt's spiritual, cultural and educational needs considered and pt agreeable to plan of care and goals.     Anticipated barriers to physical therapy: multiple strokes    Goals:   Short Term Goals: In 4 weeks   1.I with HEP. met  2.Pt to increase lumbar ROM to reaching floor without LOB. progressing  3.Pt to increase core strength to 2+/5 with core activation. progressing  4.Pt to increase BLE strength to 4+/5 with hip abduction. progressing  5.Pt to have pain less than 3/10 or better at all times.  6.Pt to improve score on the FOTO by 10%.  7. Pt to be educated on postural/body mechanics awareness.     Long  Term Goals: In 10 weeks 6/18/24  1.Patient to improve score on the FOTO to 61.  2. Patient to demo increase in LE strength to 5/5 with hip abduction  3. Patient to have decreased pain to 2/10 at worst.  4. Patient to demo increase single leg stand to 8-10 sec per leg .  5. Patient to increase dead lifting up to 50 # to manage grandchild care.        Plan   Plan of care Certification: 4/9/2024 to 6/18/24.     Outpatient Physical Therapy 2 times weekly for 10 weeks to include the following interventions: Cervical/Lumbar Traction, Electrical Stimulation IFC, NMES, Gait Training, Manual Therapy, Moist Heat/ Ice, Neuromuscular Re-ed, Patient Education, Self Care, Therapeutic Activities, Therapeutic Exercise, and DN.      Continue Plan of Care (POC) and progress per patient tolerance.    Lidia Wood, PT, CIDN, SFMA

## 2024-05-08 ENCOUNTER — TELEPHONE (OUTPATIENT)
Dept: REHABILITATION | Facility: HOSPITAL | Age: 74
End: 2024-05-08

## 2024-05-08 NOTE — TELEPHONE ENCOUNTER
PT left a vm that pt can add in an appt this week if needed since she has been unable to attend. PT also called to check on her status as she has cancelled her last few appt. PT reminded her of her appt for next week Mon and Tues.

## 2024-05-13 ENCOUNTER — CLINICAL SUPPORT (OUTPATIENT)
Dept: REHABILITATION | Facility: HOSPITAL | Age: 74
End: 2024-05-13
Payer: MEDICARE

## 2024-05-13 DIAGNOSIS — Z74.09 DECREASED FUNCTIONAL MOBILITY AND ENDURANCE: ICD-10-CM

## 2024-05-13 DIAGNOSIS — M54.50 CHRONIC BILATERAL LOW BACK PAIN WITHOUT SCIATICA: Primary | ICD-10-CM

## 2024-05-13 DIAGNOSIS — G89.29 CHRONIC BILATERAL LOW BACK PAIN WITHOUT SCIATICA: Primary | ICD-10-CM

## 2024-05-13 PROCEDURE — 97112 NEUROMUSCULAR REEDUCATION: CPT | Mod: PN

## 2024-05-13 PROCEDURE — 97110 THERAPEUTIC EXERCISES: CPT | Mod: PN

## 2024-05-13 NOTE — PROGRESS NOTES
"  Physical Therapy Daily Treatment Note     Name: Pamella Ahmadi  Clinic Number: [id number]    Therapy Diagnosis:   Encounter Diagnoses   Name Primary?    Chronic bilateral low back pain without sciatica Yes    Decreased functional mobility and endurance        Physician: Donnell Posadas MD     Visit Date: 5/13/2024    Physician Orders: PT Eval and Treat   Medical Diagnosis from Referral:   R47.1 (ICD-10-CM) - Dysarthria   R29.810 (ICD-10-CM) - Facial droop   I63.412 (ICD-10-CM) - Cerebrovascular accident (CVA) due to embolism of left middle cerebral artery      Evaluation Date: 4/9/2024  Authorization Period Expiration:12/31/24  Plan of Care Expiration: 6/18/24  Visit # / Visits authorized: 7/ 20  FOTO: 2/3  PT progress note due: 30 days from 4/9/24     Precautions: Standard, hx of dizziness. LOB to the R (R vision loss since CVA) and R neglect with using RW at this time so stay SBA to CGA for safety      Time In: 11:00 am   Time Out: 11:48 am  Total Billable Time: 48 minutes    SUBJECTIVE     Today, pt reports: feeling ok in her arms but she feels her legs are very sore.   She was compliant with home exercise program.  Response to previous treatment: sore from her last massage session  Functional change: none yet    Pre-Treatment Pain: 5/10 "I'm not in pain, I just feel weak. But I have a headache due to the rain."    Location: neck pain;   R low back and neck and upper back      OBJECTIVE         TREATMENT     Pamella received therapeutic exercises to develop strength and flexibility for 15 minutes including:    Nustep 6 min  Sit to stand from 18 inch plinth 1x 12  Seated LAQ 3# 3x 10 per leg    Deferred:  Lower trunk rotations 3x 20  Supine chin tuck with flexion 3x15  Sit to stand 3x 10   Shuttle 4 bands 3 min increase quad motor control  Prone maisha 2x 8 (Ue's fatigue; feels pulling in core)      Pamella received the following manual therapy techniques: Manual traction and Soft tissue Mobilization were " applied to the: upper and mid and low back for 0 minutes, including:    Deferred:  Soft tissue mobs to the lumbar to cervical paraspinals  Scapular mobs for depression B      Pamella participated in neuromuscular re-education activities to improve: Balance, Kinesthetic, and Proprioception for 33 minutes. The following activities were included:    Supine sciatic nerve glides 3x 10 per leg  Posterior pelvic tilt for lumbar ROM 3x 10  Ppt with single leg march 3x 10 per leg  Supine cervical flexion with chin tuck 2x 10  Supine chin tuck with retraction 2x10    Deferred:  PPT with single leg hold 2x10 x 10 sec  PPT with Double leg hold 6x 10 sec   Core activation 3x 10  Core activation with heel raises 3x 10  Sit to stand without UE support to challenge balance 18 inch seat 1x10    Deferred:  Hook lying hip adduction 3x 10  L side lying hip internal rotation (often R falls out into ER when in supine) ball at knees (RTB at ankles) 3x 10  Leaning hip extension 2x 10 1#  SLR 3x 10 B increase quad motor control 1#  Single leg hip flexion (1-2 hands for balance support) 3x 10  Standing hip extension with saucer and 1-2 hands for support 3x 10  Seated LAQ 4# 3x 10 per leg   Dead lifting 15# 3x 5 with MIN A to SBA for safety and cues  PEC with plinth elevated  L glute med engaged with plinth elevated 3x 10      Pamella participated in dynamic functional therapeutic activities to improve functional performance for 0  minutes, including:      Pamella participated in gait training to improve functional mobility and safety for 0  minutes, including:    Pamella received the following direct contact modalities after being cleared for contraindications:     Pamella received the following supervised modalities after being cleared for contradictions:     Pamella received hot pack for 0 minutes to 0.    Pamella received cold pack for 0 minutes to 0.  Pamella received electrical stimulation for 0 minutes to 0      Home Exercises  Provided and Patient Education Provided     Education/Self-Care provided: (during therex)    Patient educated on the importance of improved core and upper and lower extremity strength in order to improve alignment of the spine and upper and lower extremities with static positions and dynamic movement.   Patient educated on the importance of strong core and lower extremity musculature in order to improve both static and dynamic balance, improve gait mechanics, reduce fall risk and improve household and community mobility.       Written Home Exercises Provided: Patient instructed to cont prior HEP.  Exercises were reviewed and Pamella was able to demonstrate them prior to the end of the session.  Pamella demonstrated good  understanding of the education provided.     See EMR under Patient Instructions for exercises provided 4/16/2024.    ASSESSMENT   Pt became dizzy and nauseated after supine cervical therex tasks today so PT discontinued the session today. Pt to see vestibular specialist tomorrow. PT noted the R hip and knee were buckling today after prolonged sitting so PT worked with pt on slow transition, engaging the R hip and knee prior to taking a walk and slow progression to weight shifting into the R LE until she is certain it is holding well. This is a new finding. Pt also is still very lethargic compared to a few weeks ago. PT and pt were concerned she  had another CVA a few weeks ago as she has had a decline in the last few weeks. PT to slowly advance her as tolerated. PT noted no new neural tension issues today.      Pamella Is progressing well towards her goals.   Pt prognosis is Good.     Pt will continue to benefit from skilled outpatient physical therapy to address the deficits listed in the problem list box on initial evaluation, provide pt/family education and to maximize pt's level of independence in the home and community environment.     Pt's spiritual, cultural and educational needs  considered and pt agreeable to plan of care and goals.     Anticipated barriers to physical therapy: multiple strokes    Goals:   Short Term Goals: In 4 weeks   1.I with HEP. met  2.Pt to increase lumbar ROM to reaching floor without LOB. progressing  3.Pt to increase core strength to 2+/5 with core activation. progressing  4.Pt to increase BLE strength to 4+/5 with hip abduction. progressing  5.Pt to have pain less than 3/10 or better at all times.  6.Pt to improve score on the FOTO by 10%.  7. Pt to be educated on postural/body mechanics awareness.     Long Term Goals: In 10 weeks 6/18/24  1.Patient to improve score on the FOTO to 61.  2. Patient to demo increase in LE strength to 5/5 with hip abduction  3. Patient to have decreased pain to 2/10 at worst.  4. Patient to demo increase single leg stand to 8-10 sec per leg .  5. Patient to increase dead lifting up to 50 # to manage grandchild care.        Plan   Plan of care Certification: 4/9/2024 to 6/18/24.     Outpatient Physical Therapy 2 times weekly for 10 weeks to include the following interventions: Cervical/Lumbar Traction, Electrical Stimulation IFC, NMES, Gait Training, Manual Therapy, Moist Heat/ Ice, Neuromuscular Re-ed, Patient Education, Self Care, Therapeutic Activities, Therapeutic Exercise, and DN.      Continue Plan of Care (POC) and progress per patient tolerance.    Lidia Wood, PT, CIDN, SFMA           No indicators present

## 2024-05-14 ENCOUNTER — CLINICAL SUPPORT (OUTPATIENT)
Dept: REHABILITATION | Facility: HOSPITAL | Age: 74
End: 2024-05-14
Payer: MEDICARE

## 2024-05-14 DIAGNOSIS — M54.50 CHRONIC BILATERAL LOW BACK PAIN WITHOUT SCIATICA: Primary | ICD-10-CM

## 2024-05-14 DIAGNOSIS — G89.29 CHRONIC BILATERAL LOW BACK PAIN WITHOUT SCIATICA: Primary | ICD-10-CM

## 2024-05-14 DIAGNOSIS — Z74.09 DECREASED FUNCTIONAL MOBILITY AND ENDURANCE: ICD-10-CM

## 2024-05-14 PROCEDURE — 97112 NEUROMUSCULAR REEDUCATION: CPT | Mod: PN

## 2024-05-14 PROCEDURE — 95992 CANALITH REPOSITIONING PROC: CPT | Mod: PN

## 2024-05-14 PROCEDURE — 97530 THERAPEUTIC ACTIVITIES: CPT | Mod: PN

## 2024-05-14 NOTE — PROGRESS NOTES
"  Physical Therapy Daily Treatment Note     Name: Pamella Ahmadi  Clinic Number: [id number]    Therapy Diagnosis:   Encounter Diagnoses   Name Primary?    Chronic bilateral low back pain without sciatica Yes    Decreased functional mobility and endurance        Physician: Donnell Posadas MD     Visit Date: 5/14/2024    Physician Orders: PT Eval and Treat   Medical Diagnosis from Referral:   R47.1 (ICD-10-CM) - Dysarthria   R29.810 (ICD-10-CM) - Facial droop   I63.412 (ICD-10-CM) - Cerebrovascular accident (CVA) due to embolism of left middle cerebral artery      Evaluation Date: 4/9/2024  Authorization Period Expiration:12/31/24  Plan of Care Expiration: 6/18/24  Visit # / Visits authorized: 7/ 20  FOTO: 2/3  PT progress note due: 30 days from 4/9/24     Precautions: Standard, hx of dizziness. LOB to the R (R vision loss since CVA) and R neglect with using RW at this time so stay SBA to CGA for safety      Time In: 2:00 am   Time Out: 2:45 am  Total Billable Time: 45 minutes    SUBJECTIVE     Today, pt reports: feeling dizzy with positional change and head motion since CVA  She was compliant with home exercise program.  Response to previous treatment: sore from her last massage session  Functional change: none yet    Pre-Treatment Pain: 5/10 "I'm not in pain, I just feel weak. But I have a headache due to the rain."    Location: neck pain;   R low back and neck and upper back      OBJECTIVE         TREATMENT     Pamella received therapeutic exercises to develop strength and flexibility for 00 minutes including:    Nustep 6 min  Sit to stand from 18 inch plinth 1x 12  Seated LAQ 3# 3x 10 per leg    Deferred:  Lower trunk rotations 3x 20  Supine chin tuck with flexion 3x15  Sit to stand 3x 10   Shuttle 4 bands 3 min increase quad motor control  Prone maisha 2x 8 (Ue's fatigue; feels pulling in core)      Pamella received the following manual therapy techniques: Manual traction and Soft tissue Mobilization were " applied to the: upper and mid and low back for 0 minutes, including:    Deferred:  Soft tissue mobs to the lumbar to cervical paraspinals  Scapular mobs for depression CLEMENTINA Can participated in neuromuscular re-education activities to improve: Balance, Kinesthetic, and Proprioception for 45 minutes. The following activities were included:  Mikaela Hallpike: positive right  RIGHT LIBERATORY Maneuver   Sit on the edge of your bed with your head turned nursing home (45 degrees) to  the left.  ? Quickly lie down onto your right side. Stay in this position for 1 minute.  ? In one quick motion, move from your right side to your left side. Do not  change the position of your head. You should be looking diagonally down  towards the floor once you are lying on your left side. Stay in this position  for 1 minute.  ? Slowly return to sitting. Sit with your head level for 5 minutes and repeat.  ? Repeat this exercise one time each day until your symptoms of positional  dizziness go away or as directed by your health care provider.    Seated VOR x1 horiz and vertical 10x 2 sets ea  Seated smooth pursuit horiz and vertical    Self paced and directed by PT 10x ea      Deferred:   Supine sciatic nerve glides 3x 10 per leg  Posterior pelvic tilt for lumbar ROM 3x 10  Ppt with single leg march 3x 10 per leg  Supine cervical flexion with chin tuck 2x 10  Supine chin tuck with retraction 2x10    Deferred:  PPT with single leg hold 2x10 x 10 sec  PPT with Double leg hold 6x 10 sec   Core activation 3x 10  Core activation with heel raises 3x 10  Sit to stand without UE support to challenge balance 18 inch seat 1x10    Deferred:  Hook lying hip adduction 3x 10  L side lying hip internal rotation (often R falls out into ER when in supine) ball at knees (RTB at ankles) 3x 10  Leaning hip extension 2x 10 1#  SLR 3x 10 B increase quad motor control 1#  Single leg hip flexion (1-2 hands for balance support) 3x 10  Standing hip extension with  saucer and 1-2 hands for support 3x 10  Seated LAQ 4# 3x 10 per leg   Dead lifting 15# 3x 5 with MIN A to SBA for safety and cues  PEC with plinth elevated  L glute med engaged with plinth elevated 3x 10      Pamella participated in dynamic functional therapeutic activities to improve functional performance for 8  minutes, including:  Post care instructions provided, pt verbalized understanding:  Sit up for 2-3 hours after treatment is completed. What to avoid after BPPV treatment includes bending forward to put on your shoes, leaning back to recline, and tipping your chin down to check your phone      Pamella participated in gait training to improve functional mobility and safety for 0  minutes, including:    Pamella received the following direct contact modalities after being cleared for contraindications:     Pamella received the following supervised modalities after being cleared for contradictions:     Pamella received hot pack for 0 minutes to 0.    Pamella received cold pack for 0 minutes to 0.  Pamella received electrical stimulation for 0 minutes to 0      Home Exercises Provided and Patient Education Provided     Education/Self-Care provided: (during therex)    Patient educated on the importance of improved core and upper and lower extremity strength in order to improve alignment of the spine and upper and lower extremities with static positions and dynamic movement.   Patient educated on the importance of strong core and lower extremity musculature in order to improve both static and dynamic balance, improve gait mechanics, reduce fall risk and improve household and community mobility.       Written Home Exercises Provided: Patient instructed to cont prior HEP.  Exercises were reviewed and Pamella was able to demonstrate them prior to the end of the session.  Pamella demonstrated good  understanding of the education provided.     See EMR under Patient Instructions for exercises provided  4/16/2024.    ASSESSMENT   Canalith repositioning completed today to address right PC involvement. Pt was slightly nauseated and dizzy post maneuver. Second attempt not completed as pt will return tomorrow for next session. Recheck to be completed then. Initiated adaptation training as well to improve tracking and stabilization. Pt and  both verbalized understanding to post care instructions.     Past issues:  PT noted the R hip and knee were buckling today after prolonged sitting so PT worked with pt on slow transition, engaging the R hip and knee prior to taking a walk and slow progression to weight shifting into the R LE until she is certain it is holding well. This is a new finding. Pt also is still very lethargic compared to a few weeks ago.     Pamella Is progressing well towards her goals.   Pt prognosis is Good.     Pt will continue to benefit from skilled outpatient physical therapy to address the deficits listed in the problem list box on initial evaluation, provide pt/family education and to maximize pt's level of independence in the home and community environment.     Pt's spiritual, cultural and educational needs considered and pt agreeable to plan of care and goals.     Anticipated barriers to physical therapy: multiple strokes    Goals:   Short Term Goals: In 4 weeks   1.I with HEP. met  2.Pt to increase lumbar ROM to reaching floor without LOB. progressing  3.Pt to increase core strength to 2+/5 with core activation. progressing  4.Pt to increase BLE strength to 4+/5 with hip abduction. progressing  5.Pt to have pain less than 3/10 or better at all times.  6.Pt to improve score on the FOTO by 10%.  7. Pt to be educated on postural/body mechanics awareness.     Long Term Goals: In 10 weeks 6/18/24  1.Patient to improve score on the FOTO to 61.  2. Patient to demo increase in LE strength to 5/5 with hip abduction  3. Patient to have decreased pain to 2/10 at worst.  4. Patient to demo increase  single leg stand to 8-10 sec per leg .  5. Patient to increase dead lifting up to 50 # to manage grandchild care.        Plan   Plan of care Certification: 4/9/2024 to 6/18/24.     Outpatient Physical Therapy 2 times weekly for 10 weeks to include the following interventions: Cervical/Lumbar Traction, Electrical Stimulation IFC, NMES, Gait Training, Manual Therapy, Moist Heat/ Ice, Neuromuscular Re-ed, Patient Education, Self Care, Therapeutic Activities, Therapeutic Exercise, and DN.      Continue Plan of Care (POC) and progress per patient tolerance.    Eli Bravo, PT,

## 2024-05-20 ENCOUNTER — CLINICAL SUPPORT (OUTPATIENT)
Dept: REHABILITATION | Facility: HOSPITAL | Age: 74
End: 2024-05-20
Payer: MEDICARE

## 2024-05-20 DIAGNOSIS — G89.29 CHRONIC BILATERAL LOW BACK PAIN WITHOUT SCIATICA: Primary | ICD-10-CM

## 2024-05-20 DIAGNOSIS — M54.50 CHRONIC BILATERAL LOW BACK PAIN WITHOUT SCIATICA: Primary | ICD-10-CM

## 2024-05-20 DIAGNOSIS — Z74.09 DECREASED FUNCTIONAL MOBILITY AND ENDURANCE: ICD-10-CM

## 2024-05-20 DIAGNOSIS — R27.8 DECREASED COORDINATION: ICD-10-CM

## 2024-05-20 DIAGNOSIS — R29.898 WEAKNESS OF BOTH LOWER EXTREMITIES: Primary | ICD-10-CM

## 2024-05-20 PROCEDURE — 97530 THERAPEUTIC ACTIVITIES: CPT | Mod: PN

## 2024-05-20 PROCEDURE — 97110 THERAPEUTIC EXERCISES: CPT | Mod: PN

## 2024-05-20 PROCEDURE — 97112 NEUROMUSCULAR REEDUCATION: CPT | Mod: PN

## 2024-05-20 NOTE — PROGRESS NOTES
"  [facility]  Occupational Therapy Treatment Note & Discharge Summary     Date: 5/20/2024  Name: Pamella Ahmadi  Clinic Number: [id number]    Therapy Diagnosis:   Encounter Diagnoses   Name Primary?    Weakness of both lower extremities Yes    Decreased coordination      Physician: Donnell Posadas MD    Physician Orders: OT eval and tx  Medical Diagnosis: Dysarthria [R47.1], Facial droop [R29.810], Cerebrovascular accident (CVA) due to embolism of left middle cerebral artery [I63.412]   Evaluation Date: 4/9/2024  Insurance Authorization Period Expiration: 9/29/2025  Plan of Care Certification Period: 4/9/2024 to 6/7/2024  Progress Note Due: 6/7/2024     Visit # / Visits authorized: 4/20  FOTO: 2/3  Date of Return to MD: PRN     Precautions: HTN; implanted cardiac monitor (ICM); pre-diabetic    Time In: 1105  Time Out: 1200  Total Billable Time: 55 minutes    Subjective     Pt reports: "Two weeks ago, I wasn't feeling that great but now I'm feeling much better. My handwriting is much better now. I've been able to  my 20# grandchild."    she was compliant with home exercise program given on evaluation.  Response to previous treatment: excellent  Functional change: improved right upper extremity strength and coordination    Pain: 0/10 (none on evaluation)  Location: RUE    Objective   Objective measures updated on this date.    Strength 5/20/2024 4/9/2024 4/9/2024   **within available ROM** Right Right Left   Shoulder Flex 5/5 4+/5 5/5   Shoulder Abd 5/5 4+/5 5/5   Shoulder IR 5/5 5/5 5/5   Shoulder ER 5/5 4+/5 5/5   Elbow Flex 5/5 5/5 5/5   Elbow Ext 5/5 4+/5 5/5       Strength: (LISANDRA Dynamometer in lbs.) Average 3 trials, Position II:       5/20/2024 4/9/2024 4/9/2024     Right Right Left   Rung II 51# 40# 35#      Pinch Strength (Measured in psi)       5/20/2024 4/9/2024 4/9/2024     Right Right Left   Key Pinch 14 psi 13 psi 13 psi   3pt Pinch 12 psi 11 psi 9 psi   2pt " Pinch 8 psi 7 psi 7 psi      Fine Motor Coordination: 9-Hole Peg Test  Right  5/20/2024 Right  4/9/2024 Left  5/20/2024 Left  4/9/2024   26 sec 27 sec 32 sec 33 sec     Fine Motor Coordination: Grooved Pegboard Test  Right  4/9/2024 Left  4/9/2024   104 sec 151 sec   Not tested on evaluation 2* eyes being dilated      Intake Outcome Measure for FOTO Stroke Upper Extremity Survey     Therapist reviewed FOTO scores for Pamella Ahmadi on 5/20/2024.   FOTO documents entered into [facility] - see Media section.     Intake Score: 77%  - 59% on evaluation on 4/9/2024      Predicted Functional Score: 73%     Treatment     Pamella received the treatments listed below:      therapeutic exercises to develop strength and ROM for 30 minutes including:  - reassessment  - UBE x 6 min  - chest press with 3# dowel 2x10  - overhead press with 3# dowel 2x10  - composite  strengthening with Progressive Hand Exerciser (3rd position) 5x10  - bilateral scap retraction AROM    therapeutic activities to improve functional performance of RUE for 25 minutes including:  - red putty exercises: gripping, pinching, and finding/removing marbles  - coordination: rotation of isospheres   - coordination: completed LISANDRA 9 Hole Peg Test with each hand individually  - coordination: completed LISANDRA Grooved Pegboard Test with each hand individually    Home Exercises and Education Provided     Education provided:   - progress toward goals  - addressed any d/c concerns and questions    Written Home Exercises Provided: Patient instructed to cont prior HEP  Exercises were reviewed and Pamella was able to demonstrate them prior to the end of the session. Pamella demonstrated good understanding of the HEP provided.     See EMR under Patient Instructions for exercises provided during prior visit.        Assessment     Pamella made excellent progress with therapy. She now exhibits right upper extremity strength that is WNL as compared to left upper extremity.  She also exhibits improved right hand coordination and reports improved coordination, control, and legibility with her handwriting. She reports that she is able to  her grandchildren again. She has met all of her goals and is safe to discharge from occupational therapy services at this time.    Goals:  Short Term Goals: 4 weeks   Pt will be independent with HEP. - MET 4/16/2024  Pt will tolerate right upper extremity strengthening and endurance activities. - MET 5/1/2024  Pt will report increased legibility of handwriting. - MET 4/16/2024  Pt will report an increase in FOTO intake score of > 62%, which would indicate an improvement in quality of life. - MET 5/20/2024     Long Term Goals: 8 weeks   Pt will exhibit right shoulder MMT/strength of 5/5 needed for picking up grandchildren. - MET 5/20/2024  Pt will exhibit improved coordination, control, and legibility with all handwriting tasks. - MET 5/20/2024  Pt will report an increase in FOTO intake score of > 68%, which would indicate an improvement in quality of life. - MET 5/20/2024    Plan     Discharge occupational therapy services.    VIVIENNE ZUNIGA, OT

## 2024-05-20 NOTE — PROGRESS NOTES
Physical Therapy Daily Treatment Note and Progress Note     Name: Pamella Ahmadi  Clinic Number: [id number]    Therapy Diagnosis:   Encounter Diagnoses   Name Primary?    Chronic bilateral low back pain without sciatica Yes    Decreased functional mobility and endurance        Physician: Donnell Posadas MD     Visit Date: 5/20/2024    Physician Orders: PT Eval and Treat   Medical Diagnosis from Referral:   R47.1 (ICD-10-CM) - Dysarthria   R29.810 (ICD-10-CM) - Facial droop   I63.412 (ICD-10-CM) - Cerebrovascular accident (CVA) due to embolism of left middle cerebral artery      Evaluation Date: 4/9/2024  Authorization Period Expiration:12/31/24  Plan of Care Expiration: 6/18/24  Visit # / Visits authorized: 9/ 20  FOTO: 2/3  PT progress note due: 30 days from 5/20/24     Precautions: Standard, hx of dizziness. LOB to the R (R vision loss since CVA) and R neglect with using RW at this time so stay SBA to CGA for safety      Time In: 10:01 am   Time Out: 10:58 am  Total Billable Time: 57 minutes    SUBJECTIVE     Today, pt reports: she has been less dizzy since the last session but she felt dizzy when I turned her chair to come off the nustep today.  She was compliant with home exercise program.  Response to previous treatment: sore from her last massage session  Functional change: none yet    Pre-Treatment Pain: 4/10 feels weak In R knee    Location: neck pain;   R low back and neck and upper back      OBJECTIVE   Gait: at eval anterior lean, LOB to the R, R side neglect due to LOB on R, anterior pelvic tilt  Today pt is still using her RW for support but more aware on R and doesn't walk into objects showing better balance and less path deviation than noted on eval.        Balance: Single leg stand R=2 sec, L=2 sec; squat sit to stand test without UE support for 30 sec 8 reps at eval but today R 2 sec; L 2; sit to stand test 10 reps in 30 sec    Sit to stand has LOB at times posterior at eval but not today    At  eval:  Modified CTSIB test: to be performed on follow up as she was fatigued and simple wide MOLLY no UE balance fatigued after 10 sec so PT didn't work on unlevel jacqui challenges.  Today: able to complete part 1 and 2 of the test at 30 sec and 5 sec accordingly     CROM at eval vs today  Flexion 50  55  Extension 50  50  R Side flexion 25 30  L  30   35  R rotation 65  80  L 70   75     Lumbar spine AROM at eval and today:       Degrees Pain Present (Y/N)   Flexion 5 inches from floor and having some LOB; 3 inches from floor n   R side bend 15 n   L 15 n   Extend 20 n      Hip AROM:       Degrees (R/L) Pain Present (Y/N)   Hip flex 120/115 n   Hip Abd 30 n   Hip ER (sitting) 40/35; 45/50 n   Hip IR (sitting) 30/40; 35/40 n   Hip Ext (prone) Lags 5 degrees from neutral n      Transfers:  PT noted bed mobility is challenging for turning to side or onto stomach needing up to MIN A at times  Sit to stand at times MIN A due to LOB            At eval Today  Strength:                                            R          L R L              Hip flexors L2                          4/5       4/5 4+ 5  Quadriceps L234                    4/5       4/5       5 5              Hamstrings S1                        4+/5     4+/5 5 5              Plantar flexion S1                    2/5       2/5      2 3                Dorsiflexion L4                        4/5       4/5 5 5              Gluteus Medius L45S1           3+/5     3+/5 4 4              Gluteus Byron                    2+/5     2+/5 3 3                   TREATMENT     Pamella received therapeutic exercises to develop strength and flexibility for 15 minutes including:    Nustep 6 min  Seated LAQ 4# 3x 10 per leg  Sit to stand from elevated seat 1x 10     Deferred:  Sit to stand from 18 inch plinth 1x 12  Lower trunk rotations 3x 20  Supine chin tuck with flexion 3x15  Sit to stand 3x 10   Shuttle 4 bands 3 min increase quad motor control  Prone maisha 2x 8 (Ue's  fatigue; feels pulling in core)      Pamella received the following manual therapy techniques: Manual traction and Soft tissue Mobilization were applied to the: upper and mid and low back for 0 minutes, including:    Deferred:  Soft tissue mobs to the lumbar to cervical paraspinals  Scapular mobs for depression B      Pamella participated in neuromuscular re-education activities to improve: Balance, Kinesthetic, and Proprioception for 43 minutes. The following activities were included:    Sit to stand goblet hold 15# 3x 10 (from [employer] machine) to challenge anterior/posterior motor control  Seated sciatic nerve glides R LE 1x 10  Supine march 4# 3x 10 per leg  Side lying hip abduction 4# per leg 2x 10  Standing trunk flexion 2x 5  Narrow MOLLY static holds x 3  Narrow MOLLY static hold with eyes closed x 3      Deferred:  [place] Hallpike: positive right  RIGHT LIBERATORY Maneuver   Sit on the edge of your bed with your head turned detention (45 degrees) to  the left.  ? Quickly lie down onto your right side. Stay in this position for 1 minute.  ? In one quick motion, move from your right side to your left side. Do not  change the position of your head. You should be looking diagonally down  towards the floor once you are lying on your left side. Stay in this position  for 1 minute.  ? Slowly return to sitting. Sit with your head level for 5 minutes and repeat.  ? Repeat this exercise one time each day until your symptoms of positional  dizziness go away or as directed by your health care provider.  Seated VOR x1 horiz and vertical 10x 2 sets ea  Seated smooth pursuit horiz and vertical    Self paced and directed by PT 10x ea    Ppt with single leg march 3x 10 per leg  Supine cervical flexion with chin tuck 2x 10  Supine chin tuck with retraction 2x10  PPT with single leg hold 2x10 x 10 sec  PPT with Double leg hold 6x 10 sec   Core activation 3x 10  Core activation with heel raises 3x 10  Sit to stand without UE support to  challenge balance 18 inch seat 1x10        Pamella participated in dynamic functional therapeutic activities to improve functional performance for 0  minutes, including:  Deferred:  Post care instructions provided, pt verbalized understanding:  Sit up for 2-3 hours after treatment is completed. What to avoid after BPPV treatment includes bending forward to put on your shoes, leaning back to recline, and tipping your chin down to check your phone      Pamella participated in gait training to improve functional mobility and safety for 0  minutes, including:    Pamella received the following direct contact modalities after being cleared for contraindications:     Pamella received the following supervised modalities after being cleared for contradictions:     Pamella received hot pack for 0 minutes to 0.    Pamella received cold pack for 0 minutes to 0.  Pamella received electrical stimulation for 0 minutes to 0      Home Exercises Provided and Patient Education Provided     Education/Self-Care provided: (during therex)    Patient educated on the importance of improved core and upper and lower extremity strength in order to improve alignment of the spine and upper and lower extremities with static positions and dynamic movement.   Patient educated on the importance of strong core and lower extremity musculature in order to improve both static and dynamic balance, improve gait mechanics, reduce fall risk and improve household and community mobility.       Written Home Exercises Provided: Patient instructed to cont prior HEP.  Exercises were reviewed and Pamella was able to demonstrate them prior to the end of the session.  Pamella demonstrated good  understanding of the education provided.     See EMR under Patient Instructions for exercises provided 4/16/2024.    ASSESSMENT   Pt can transfer with less dizziness today from supine to sit but still mild dizziness intermittently with transition. PT noted gains in  "balance and endurance but she is still having a lot of discomfort with dizziness intermittently but overall improved. L LE is weak or fatigues more with L hip abduction but LAQ feel heavier on the R. Overall, she is progressing but "feels" she is still not even close to her PLOF..   .     Pamella Is progressing well towards her goals.   Pt prognosis is Good.     Pt will continue to benefit from skilled outpatient physical therapy to address the deficits listed in the problem list box on initial evaluation, provide pt/family education and to maximize pt's level of independence in the home and community environment.     Pt's spiritual, cultural and educational needs considered and pt agreeable to plan of care and goals.     Anticipated barriers to physical therapy: multiple strokes    Goals:   Short Term Goals: In 4 weeks   1.I with HEP. met  2.Pt to increase lumbar ROM to reaching floor without LOB. progressing  3.Pt to increase core strength to 2+/5 with core activation. progressing  4.Pt to increase BLE strength to 4+/5 with hip abduction. progressing  5.Pt to have pain less than 3/10 or better at all times.  6.Pt to improve score on the FOTO by 10%.  7. Pt to be educated on postural/body mechanics awareness.     Long Term Goals: In 10 weeks 6/18/24  1.Patient to improve score on the FOTO to 61.  2. Patient to demo increase in LE strength to 5/5 with hip abduction  3. Patient to have decreased pain to 2/10 at worst.  4. Patient to demo increase single leg stand to 8-10 sec per leg .  5. Patient to increase dead lifting up to 50 # to manage grandchild care.        Plan   Plan of care Certification: 4/9/2024 to 6/18/24.     Outpatient Physical Therapy 2 times weekly for 10 weeks to include the following interventions: Cervical/Lumbar Traction, Electrical Stimulation IFC, NMES, Gait Training, Manual Therapy, Moist Heat/ Ice, Neuromuscular Re-ed, Patient Education, Self Care, Therapeutic Activities, Therapeutic " Exercise, and DN.      Continue Plan of Care (POC) and progress per patient tolerance.    Lidia Wood PT,

## 2024-05-22 ENCOUNTER — CLINICAL SUPPORT (OUTPATIENT)
Dept: REHABILITATION | Facility: HOSPITAL | Age: 74
End: 2024-05-22
Payer: MEDICARE

## 2024-05-22 DIAGNOSIS — G89.29 CHRONIC BILATERAL LOW BACK PAIN WITHOUT SCIATICA: Primary | ICD-10-CM

## 2024-05-22 DIAGNOSIS — M54.50 CHRONIC BILATERAL LOW BACK PAIN WITHOUT SCIATICA: Primary | ICD-10-CM

## 2024-05-22 DIAGNOSIS — R47.1 DYSARTHRIA: ICD-10-CM

## 2024-05-22 DIAGNOSIS — R47.01 APHASIA: Primary | ICD-10-CM

## 2024-05-22 DIAGNOSIS — Z74.09 DECREASED FUNCTIONAL MOBILITY AND ENDURANCE: ICD-10-CM

## 2024-05-22 PROCEDURE — 97112 NEUROMUSCULAR REEDUCATION: CPT | Mod: PN

## 2024-05-22 PROCEDURE — 97110 THERAPEUTIC EXERCISES: CPT | Mod: PN,59

## 2024-05-22 PROCEDURE — 92507 TX SP LANG VOICE COMM INDIV: CPT | Mod: KX,PN

## 2024-05-22 NOTE — PROGRESS NOTES
Physical Therapy Daily Treatment Note     Name: Pamella Ahmadi  Clinic Number: [id number]    Therapy Diagnosis:   Encounter Diagnoses   Name Primary?    Chronic bilateral low back pain without sciatica Yes    Decreased functional mobility and endurance        Physician: Donnell Posadas MD     Visit Date: 5/22/2024    Physician Orders: PT Eval and Treat   Medical Diagnosis from Referral:   R47.1 (ICD-10-CM) - Dysarthria   R29.810 (ICD-10-CM) - Facial droop   I63.412 (ICD-10-CM) - Cerebrovascular accident (CVA) due to embolism of left middle cerebral artery      Evaluation Date: 4/9/2024  Authorization Period Expiration:12/31/24  Plan of Care Expiration: 6/18/24  Visit # / Visits authorized: 10/ 20  FOTO: 2/3  PT progress note due: 30 days from 5/20/24     Precautions: Standard, hx of dizziness. LOB to the R (R vision loss since CVA) and R neglect with using RW at this time so stay SBA to CGA for safety      Time In: 10:01 am   Time Out: 10:56 am  Total Billable Time: 55 minutes    SUBJECTIVE     Today, pt reports: she is having less dizziness when getting in and out of bed  She was compliant with home exercise program.  Response to previous treatment: sore from her last massage session  Functional change: none yet    Pre-Treatment Pain: 7/10 low back  Post pain: 2/10    Location: R low back       OBJECTIVE        TREATMENT     Pamella received therapeutic exercises to develop strength and flexibility for 25 minutes including:    Shuttle 3 bands for 10 min (for joint nutrition as arrived with a lot of back pain-pain reduced to 3/10 by end )  Sit to stand from 18 inch box 2x 10  Seated rows 40 #3x 10  Hip openers 3x 7      Deferred:  Nustep 6 min  Seated LAQ 4# 3x 10 per leg  Lower trunk rotations 3x 20  Supine chin tuck with flexion 3x15  Sit to stand 3x 10   Shuttle 4 bands 3 min increase quad motor control  Prone maisha 2x 8 (Ue's fatigue; feels pulling in core)      Pamella received the following manual  therapy techniques: Manual traction and Soft tissue Mobilization were applied to the: upper and mid and low back for 0 minutes, including:    Deferred:  Soft tissue mobs to the lumbar to cervical paraspinals  Scapular mobs for depression CLEMENTINA Can participated in neuromuscular re-education activities to improve: Balance, Kinesthetic, and Proprioception for 30 minutes. The following activities were included:    Sit to stand from 18 inch seat with eyes focus on the letter A 2x 10  Seated trunk rotation 20 # setting 3 with eyes focused on letter M 1x 25 per direction  Supine posterior pelvic tilt (PPT) with elevated plinth 3x 15  Supine single leg table top hold  while other leg marches 3x 10 per leg  Bicycle with plinth elevated 2x 10    Deferred:  Sit to stand goblet hold 15# 3x 10 (from nustep machine) to challenge anterior/posterior motor control  Seated sciatic nerve glides R LE 1x 10  Supine march 4# 3x 10 per leg  Side lying hip abduction 4# per leg 2x 10  Standing trunk flexion 2x 5  Narrow MOLLY static holds x 3  Narrow MOLLY static hold with eyes closed x 3  Mikaela Hallpike: positive right  RIGHT LIBERATORY Maneuver   Sit on the edge of your bed with your head turned residential (45 degrees) to  the left.  ? Quickly lie down onto your right side. Stay in this position for 1 minute.  ? In one quick motion, move from your right side to your left side. Do not  change the position of your head. You should be looking diagonally down  towards the floor once you are lying on your left side. Stay in this position  for 1 minute.  ? Slowly return to sitting. Sit with your head level for 5 minutes and repeat.  ? Repeat this exercise one time each day until your symptoms of positional  dizziness go away or as directed by your health care provider.  Seated VOR x1 horiz and vertical 10x 2 sets ea  Seated smooth pursuit horiz and vertical    Self paced and directed by PT 10x ea    Ppt with single leg march 3x 10 per leg  Supine  cervical flexion with chin tuck 2x 10  Supine chin tuck with retraction 2x10  PPT with single leg hold 2x10 x 10 sec  PPT with Double leg hold 6x 10 sec   Core activation 3x 10  Core activation with heel raises 3x 10  Sit to stand without UE support to challenge balance 18 inch seat 1x10        Pamella participated in dynamic functional therapeutic activities to improve functional performance for 0  minutes, including:    Deferred:  Post care instructions provided, pt verbalized understanding:  Sit up for 2-3 hours after treatment is completed. What to avoid after BPPV treatment includes bending forward to put on your shoes, leaning back to recline, and tipping your chin down to check your phone      Pamella participated in gait training to improve functional mobility and safety for 0  minutes, including:    Pamella received the following direct contact modalities after being cleared for contraindications:     Pamella received the following supervised modalities after being cleared for contradictions:     Pamella received hot pack for 0 minutes to 0.    Pamella received cold pack for 0 minutes to 0.  Pamella received electrical stimulation for 0 minutes to 0      Home Exercises Provided and Patient Education Provided     Education/Self-Care provided: (during therex)    Patient educated on the importance of improved core and upper and lower extremity strength in order to improve alignment of the spine and upper and lower extremities with static positions and dynamic movement.   Patient educated on the importance of strong core and lower extremity musculature in order to improve both static and dynamic balance, improve gait mechanics, reduce fall risk and improve household and community mobility.       Written Home Exercises Provided: Patient instructed to cont prior HEP.  Exercises were reviewed and Pamella was able to demonstrate them prior to the end of the session.  Pamella demonstrated good   "understanding of the education provided.     See EMR under Patient Instructions for exercises provided 4/16/2024.    ASSESSMENT   Pt can transfer with less dizziness today still. Also rotation on the med x core machine is without dizziness. PT added in focsing on letters for gaze stabilization with therex when able. PT added in more core work today. Pt would like to be safer without a RW but using it still helps her feel more stable so PT to add in more single leg and balance work and progress to a SC when safe.   Overall, she is progressing but "feels" she is still not even close to her PLOF.     Pamella Is progressing well towards her goals.   Pt prognosis is Good.     Pt will continue to benefit from skilled outpatient physical therapy to address the deficits listed in the problem list box on initial evaluation, provide pt/family education and to maximize pt's level of independence in the home and community environment.     Pt's spiritual, cultural and educational needs considered and pt agreeable to plan of care and goals.     Anticipated barriers to physical therapy: multiple strokes    Goals:   Short Term Goals: In 4 weeks   1.I with HEP. met  2.Pt to increase lumbar ROM to reaching floor without LOB. progressing  3.Pt to increase core strength to 2+/5 with core activation. progressing  4.Pt to increase BLE strength to 4+/5 with hip abduction. progressing  5.Pt to have pain less than 3/10 or better at all times.  6.Pt to improve score on the FOTO by 10%.  7. Pt to be educated on postural/body mechanics awareness.     Long Term Goals: In 10 weeks 6/18/24  1.Patient to improve score on the FOTO to 61.  2. Patient to demo increase in LE strength to 5/5 with hip abduction  3. Patient to have decreased pain to 2/10 at worst.  4. Patient to demo increase single leg stand to 8-10 sec per leg .  5. Patient to increase dead lifting up to 50 # to manage grandchild care.        Plan   Plan of care Certification: " 4/9/2024 to 6/18/24.     Outpatient Physical Therapy 2 times weekly for 10 weeks to include the following interventions: Cervical/Lumbar Traction, Electrical Stimulation IFC, NMES, Gait Training, Manual Therapy, Moist Heat/ Ice, Neuromuscular Re-ed, Patient Education, Self Care, Therapeutic Activities, Therapeutic Exercise, and DN.      Continue Plan of Care (POC) and progress per patient tolerance.    Lidia Wood, PT

## 2024-05-22 NOTE — PROGRESS NOTES
[facility]  Speech Therapy Treatment Note- Neurological Rehabilitation  Date: 5/22/2024     Name: Pamella Ahmadi   MRN: [id number]   Therapy Diagnosis:   Encounter Diagnoses   Name Primary?    Aphasia Yes    Dysarthria    Physician: Donnell Posadas MD  Physician Orders: Ambulatory Referral to Speech Therapy   Medical Diagnosis:   R47.1 (ICD-10-CM) - Dysarthria   R29.810 (ICD-10-CM) - Facial droop   I63.412 (ICD-10-CM) - Cerebral infarction due to embolism of left middle cerebral artery       Visit #/ Visits Authorized: 3/ 20  Date of Evaluation:  4/9/2024  Insurance Authorization Period: 4/16/2024 - 12/31/2024  Plan of Care Expiration Date:    7/2/2024  Extended Plan of Care:  NA   Progress Note:  5/17/2024    Time In:  11:00 AM   Time Out: 11:30 PM   Total Billable Time: 30 minutes      Precautions: Standard and Fall  Subjective:   Patient reports: Speech symptoms have resolved. No concerns for motor speech or language deficits.    She was compliant to home exercise program.   Response to previous treatment: good - all goals met .   Pain Scale: no pain indicated throughout session  Objective:           UNTIMED  Procedure   Speech- Language- Voice Therapy      Short Term Goals: (6 weeks) Current Progress:   1. Patient will complete reading/writing assessment.      GOAL MET 4/19/2024.  GOAL MET. Completed 4/19/2024.    2. Patient will use Semantic Feature Analysis to describe objects with 90% accuracy and will self-monitor verbal output of message with moderate clinician redirection.            GOAL MET 5/3/2024 Introduced today. Patient able to complete SFA charts with 100% accuracy independently. She reports continued difficulty with writing from a fine motor standpoint.     GOAL MET 5/3/2024   3.  Patient will utilize word finding strategies in structured tasks with 90% accuracy and minimal cues.            GOAL MET 5/22/2024 Circumlocution strategy discussed again today. No significant word  finding deficits noted during conversational tasks today.     GOAL MET 2024   4. Patient will achieve 90% accuracy on oral expression while reading sentences out loud in unison with use of the Oral Reading for Language in Aphasia Protocol (DREA).      GOAL MET 5/3/2024 DREA Level 3 completed today with 100% accuracy independently.         GOAL MET 5/3/2024   5.  Patient will summarize read material to improve word fluency, word finding, and reading comprehension in Attentive Reading and Constrained Summarization (ARCS) protocol with 80% accuracy and minimal verbal assistance across 2-3 sessions.       GOAL MET 2024 Completed with six paragraph article today. Patient able to summarize read material with 80% accuracy and minimal cueing. She was asked to complete written summary as part of her HEP.     GOAL MET 2024       Patient Education/Response:   Patient educated regarding the followin. SFA, DREA, and ARCS protocol  2. Dysarthria strategies for when she is tired  3. Word finding strategies    Home program established: yes-Patient instructed to complete ARCS (written summary).   Patient verbalized understanding to all above education provided.     See Electronic Medical Record under Patient Instructions for exercises provided throughout therapy.  Assessment:   Patient is receptive to all education provided and participates in all therapy tasks. Given resolution of word finding deficits, patient and clinician discussed discharge from . She verbalized understanding. See discharge note.     Pamella is progressing well towards her goals. Current goals remain appropriate. Goals to be updated as necessary.     Patient prognosis is Excellent. Patient will continue to benefit from skilled outpatient speech and language therapy to address the deficits listed in the problem list on initial evaluation, provide patient/family education and to maximize patient's level of independence in the home and  community environment.   Medical necessity is demonstrated by the following IMPAIRMENTS:  Aphasia impacts ability to communicate medical/urgent needs and participate in social and community interactions   Barriers to Therapy: none  Patient's spiritual, cultural and educational needs considered and patient agreeable to plan of care and goals.  Plan:   Continue Plan of Care with focus on rehabilitation and compensation for aphasia.     Mercy Pang, CCC-SLP, CBIS   Speech Language Pathologist   Certified Brain Injury Specialist   5/22/2024

## 2024-05-23 NOTE — PLAN OF CARE
Outpatient Therapy Discharge Summary   Discharge Date: 5/22/2024   Name: Pamella Ahmadi  Clinic Number: [id number]  Therapy Diagnosis:   Encounter Diagnoses   Name Primary?    Aphasia Yes    Dysarthria      Physician: Donnell Posadas MD  Physician Orders: Ambulatory Referral to Speech Therapy   Medical Diagnosis:   R47.1 (ICD-10-CM) - Dysarthria   R29.810 (ICD-10-CM) - Facial droop   I63.412 (ICD-10-CM) - Cerebral infarction due to embolism of left middle cerebral artery     Evaluation Date: 4/9/2024    Date of Last visit: 5/22/2024  Total Visits Received: 3  Cancelled Visits: 0  No Show Visits: 0    Assessment    Assessment of Current Status:   Patient has progressed well and has met all goals. She has been receptive to education provided and has participated in all therapy tasks. At this time, she endorses resolution of motor speech and language deficits. No significant motor speech deficits observed in conversational tasks. No significant language deficits observed in conversational tasks.      Goals:     Short Term Goals: (6 weeks)   1. Patient will complete reading/writing assessment.      GOAL MET 4/19/2024.    2. Patient will use Semantic Feature Analysis to describe objects with 90% accuracy and will self-monitor verbal output of message with moderate clinician redirection.               GOAL MET 5/3/2024   3.  Patient will utilize word finding strategies in structured tasks with 90% accuracy and minimal cues.         Progressing/ Not Met 5/3/2024     4. Patient will achieve 90% accuracy on oral expression while reading sentences out loud in unison with use of the Oral Reading for Language in Aphasia Protocol (DREA).      GOAL MET 5/3/2024   5.  Patient will summarize read material to improve word fluency, word finding, and reading comprehension in Attentive Reading and Constrained Summarization (ARCS) protocol with 80% accuracy and minimal verbal assistance across 2-3 sessions.     Progressing/ Not Met  5/3/2024         Long Term Goals:  1. Patient will improve oral expression for improved overall communication in medical, social, vocational, home environments. GOAL MET 5/22/2024  2. Patient will improve reading comprehension and writing skills for overall improved communication in medical, social, vocational, home environments.  GOAL MET 5/22/2024    Discharge reason: Patient is now asymptomatic and Patient has met all of his/her goals    Plan   This patient is discharged from Speech Therapy    JOI Velasco-SLP, CCC-SLP, CBIS   Speech Language Pathologist   Certified Brain Injury Specialist   5/23/2024

## 2024-08-06 ENCOUNTER — CLINICAL SUPPORT (OUTPATIENT)
Dept: REHABILITATION | Facility: HOSPITAL | Age: 74
End: 2024-08-06
Payer: MEDICARE

## 2024-08-06 DIAGNOSIS — G89.29 CHRONIC BILATERAL LOW BACK PAIN WITHOUT SCIATICA: Primary | ICD-10-CM

## 2024-08-06 DIAGNOSIS — R26.2 DIFFICULTY WALKING: ICD-10-CM

## 2024-08-06 DIAGNOSIS — M54.50 CHRONIC BILATERAL LOW BACK PAIN WITHOUT SCIATICA: Primary | ICD-10-CM

## 2024-08-06 DIAGNOSIS — Z74.09 DECREASED FUNCTIONAL MOBILITY AND ENDURANCE: ICD-10-CM

## 2024-08-06 PROCEDURE — 97161 PT EVAL LOW COMPLEX 20 MIN: CPT | Mod: PN

## 2024-08-06 PROCEDURE — 97112 NEUROMUSCULAR REEDUCATION: CPT | Mod: PN

## 2024-08-13 ENCOUNTER — CLINICAL SUPPORT (OUTPATIENT)
Dept: REHABILITATION | Facility: HOSPITAL | Age: 74
End: 2024-08-13
Payer: MEDICARE

## 2024-08-13 DIAGNOSIS — R26.2 DIFFICULTY WALKING: Primary | ICD-10-CM

## 2024-08-13 PROCEDURE — 97112 NEUROMUSCULAR REEDUCATION: CPT | Mod: PN

## 2024-08-13 PROCEDURE — 97116 GAIT TRAINING THERAPY: CPT | Mod: PN

## 2024-08-13 NOTE — PROGRESS NOTES
Physical Therapy Daily Treatment Note     Name: Pamella Ahmadi  Clinic Number: [id number]    Therapy Diagnosis: No diagnosis found.  Physician: Carisa Martin MD    Visit Date: 8/13/2024    Physician Orders: PT Eval and Treat   Medical Diagnosis from Referral:   R29.898 (ICD-10-CM) - Right leg weakness   I69.359 (ICD-10-CM) - CVA, old, hemiparesis      Evaluation Date: 8/6/2024  Authorization Period Expiration: 12/31/24  Plan of Care Expiration: 10/9/24  Visit # / Visits authorized: 1/ 21  FOTO: 1/3  Progress note due 30 days from 8/6/24     Precautions: Standard, falls; LOB to the R, gets overheated easily at times; hx of dizziness but appears resolved even with high reps of trunk flexion today and rotation tasks      Time In: 11:04 am (11:05-11:35)  Time Out: 12: 00 pm  Total Time: 30 minutes    SUBJECTIVE     Today, pt reports: she has been working on cane use at home. PT encourage no cane use as she is not strong enough or coordinated enough.  She was compliant with home exercise program.  Response to previous treatment: no issues  Functional change: none yet    Pre-Treatment Pain: 4/10  Post-Treatment Pain: 3/10  Location: hips  TREATMENT     Pamella received therapeutic exercises to develop strength, ROM, and posture for 0 minutes including:        Pamella received the following manual therapy techniques: Joint mobilizations, Myofacial release, and Soft tissue Mobilization were applied to the: 0 for 0 minutes, including:          Pamella participated in neuromuscular re-education activities to improve: Balance, Kinesthetic, and Proprioception for 15 min one on one with PT then with tech assistance for remainder of session  minutes. The following activities were included:    Shuttle 4 bands 3 min; 5 bands 2 min  Shuttle heel /toe raises 2x 10 4 bands  Sit to stand from 18 inch box 2x 10  Single leg taps to 18 inch seat with MIN A 1x 10 per leg; 12 inch step 2x 10 min A to CGA   Supine posterior  pelvic tilts 3x 10  Repeated standing flexion 2x 10  R side lying hip abduction static hold 30 sec x 2x 10  R side lying hip abduction 2x 10      Deferred:  Add table top holds 10 sec x 10 reps   Add shoulder flexion with yellow PB to table top holds  Wall lateral maisha side glides (r shoulder to the wall) 2x 10  L single leg stand with cues on lateral lean L x trial to max tolerance with and without single point lateral support  Heel walking  Sciatic nerve glides supine (add seated on follow up due to neural and hamstring tension) 1x 10 per leg    Pamella participated in dynamic functional therapeutic activities to improve functional performance for 0  minutes, including:      Pamella participated in gait training to improve functional mobility and safety for 15  minutes with PT only, including:  SC gait training assessment and training; cues on cane in R vs L hand and placement and PT adjusted cane height  Not safe without gait belt and up to MIN A; difficulty coordinating    Pamella received the following direct contact modalities after being cleared for contraindications:     Pamella received the following supervised modalities after being cleared for contradictions:         Home Exercises Provided and Patient Education Provided     Education/Self-Care provided: (during therex)    Patient educated on the importance of improved core and upper and lower extremity strength in order to improve alignment of the spine and upper and lower extremities with static positions and dynamic movement.   Patient educated on the importance of strong core and lower extremity musculature in order to improve both static and dynamic balance, improve gait mechanics, reduce fall risk and improve household and community mobility.       Written Home Exercises Provided: Patient instructed to cont prior HEP.  Exercises were reviewed and Pamella was able to demonstrate them prior to the end of the session.  Pamella demonstrated  good  understanding of the education provided.     See EMR under Patient Instructions for exercises provided prior visit.    ASSESSMENT   Pt tolerated therex with PT working on core and hip motor control. Pt had LOB posterior often needing up to MIN A for safety when working without a RW or attempting cane use. PT encouraged pt to hold off on SC use at home as she is not coordinated and has depth perception issues as well as strength and balance issues that need to improve to advance safely. Pt demonstrated good understanding of exercises and required minimal cueing to maintain proper form.      Pamella Is progressing well towards her goals.   Pt prognosis is Good.     Pt will continue to benefit from skilled outpatient physical therapy to address the deficits listed in the problem list box on initial evaluation, provide pt/family education and to maximize pt's level of independence in the home and community environment.     Pt's spiritual, cultural and educational needs considered and pt agreeable to plan of care and goals.     Anticipated barriers to physical therapy: LOB and fall hazards    Goals:   Short Term Goals: In 4 weeks   1.I with HEP. progressing  2.Pt to increase lumbar ROM to reaching floor without R LE shaking or knees flexed with lumbar flexion.    3.Pt to increase hip AROM to hip extension to 5 degree lag or better to allow glutes to engage better.  4.Pt to increase L single leg stand time to 5 sec or better  5.Pt to have pain less than 5/10 at all times.  6.Pt to improve score on the FOTO by 10%.  7. Pt to be educated on postural/body mechanics awareness.     Long Term Goals: In 10 weeks 10/9/24  1.Patient to improve score on the FOTO to 65 or better.  2. Patient to demo increase in LE strength to hip abduction L to 5/5 and extension 4+ or better  3. Patient to have decreased pain to 2/10 or better at worst.  4. Patient to demo increase lumbar ROM to pain free lateral trunk mobility and L side  flexion to knee or better.  5. Patient to perform daily activities including squatting without lateral shift  6. Pt to perform dead lifting up to 40# or better to manage laundry load and carry up to 25# for 50 ft without LOB for  or other home tasks.        Plan   Plan of care Certification: 8/6/2024 to 10/9/24.     Outpatient Physical Therapy 2 times weekly for 10 weeks to include the following interventions: Cervical/Lumbar Traction, Electrical Stimulation IFC, NMES, Gait Training, Manual Therapy, Moist Heat/ Ice, Neuromuscular Re-ed, Patient Education, Self Care, Therapeutic Activities, Therapeutic Exercise, and DN.      Continue Plan of Care (POC) and progress per patient tolerance.    Lidia Wood, PT, CIDN, SFMA, CEAS-1

## 2024-08-22 ENCOUNTER — CLINICAL SUPPORT (OUTPATIENT)
Dept: REHABILITATION | Facility: HOSPITAL | Age: 74
End: 2024-08-22
Payer: MEDICARE

## 2024-08-22 DIAGNOSIS — R26.2 DIFFICULTY WALKING: Primary | ICD-10-CM

## 2024-08-22 PROCEDURE — 97112 NEUROMUSCULAR REEDUCATION: CPT | Mod: PN

## 2024-08-22 PROCEDURE — 97530 THERAPEUTIC ACTIVITIES: CPT | Mod: PN

## 2024-08-22 PROCEDURE — 97110 THERAPEUTIC EXERCISES: CPT | Mod: PN

## 2024-08-22 NOTE — PROGRESS NOTES
Physical Therapy Daily Treatment Note     Name: Pamella Ahmadi  Clinic Number: [id number]    Therapy Diagnosis:   Encounter Diagnosis   Name Primary?    Difficulty walking Yes     Physician: Carisa Martin MD    Visit Date: 8/22/2024    Physician Orders: PT Eval and Treat   Medical Diagnosis from Referral:   R29.898 (ICD-10-CM) - Right leg weakness   I69.359 (ICD-10-CM) - CVA, old, hemiparesis      Evaluation Date: 8/6/2024  Authorization Period Expiration: 12/31/24  Plan of Care Expiration: 10/9/24  Visit # / Visits authorized: 3/ 21  FOTO: 1/3  Progress note due 30 days from 8/6/24     Precautions: Standard, falls; LOB to the R, gets overheated easily at times; hx of dizziness but appears resolved even with high reps of trunk flexion today and rotation tasks      Time In: 10:30  Time Out: 11:15  Total Time: 45 minutes    SUBJECTIVE     Today, pt reports: she has been working on cane use at home.    She was compliant with home exercise program.  Response to previous treatment: no issues  Functional change: none yet    Pre-Treatment Pain: 4/10  Post-Treatment Pain: 3/10  Location: hips  TREATMENT     Pamella received therapeutic exercises to develop strength, ROM, and posture for 12 minutes including:  Nustep level 1.6 6 min  Gastroc stretch 1min 2x      Pamella received the following manual therapy techniques: Joint mobilizations, Myofacial release, and Soft tissue Mobilization were applied to the: 0 for 0 minutes, including:      Pamella participated in neuromuscular re-education activities to improve: Balance, Kinesthetic, and Proprioception for 15 min one on one with PT then with tech assistance for remainder of session  minutes. The following activities were included:    Shuttle 5 bands 3 min DL  Shuttle 4B SL 1min ea leg  NBOS: head rotation and nod 1x10 eyes open and closed mod A for safety  Staggered stance with head rotation and nod 1x10 mod A for safety   Foam balance mod A   Stepping on off  "foam 1x5  Standing with ball toss attempting to keep eyes on ball for vertical lift of head and gaze        Deferred:  Add table top holds 10 sec x 10 reps   Add shoulder flexion with yellow PB to table top holds  Wall lateral masiha side glides (r shoulder to the wall) 2x 10  L single leg stand with cues on lateral lean L x trial to max tolerance with and without single point lateral support  Heel walking  Sciatic nerve glides supine (add seated on follow up due to neural and hamstring tension) 1x 10 per leg    Pamella participated in dynamic functional therapeutic activities to improve functional performance for 12 minutes, including:  Sit to stand from 18 inch box 2x 10 holding 10# ball 1x10  STS with gaze stab 1x10  Squat holding non weighted ball to 12" box with OH press 1x10  Squat holding non weighted ball to floor followed by vertical toss of ball 1x10      Pamella participated in gait training to improve functional mobility and safety for 15  minutes with PT only, including:  SC gait training assessment and training; cues on cane in R hand with mod to max cues for sequencing     Not safe without gait belt and up to MIN A; difficulty coordinating    Pamella received the following direct contact modalities after being cleared for contraindications:     Pamella received the following supervised modalities after being cleared for contradictions:         Home Exercises Provided and Patient Education Provided     Education/Self-Care provided: (during therex)    Patient educated on the importance of improved core and upper and lower extremity strength in order to improve alignment of the spine and upper and lower extremities with static positions and dynamic movement.   Patient educated on the importance of strong core and lower extremity musculature in order to improve both static and dynamic balance, improve gait mechanics, reduce fall risk and improve household and community mobility.       Written Home " Exercises Provided: Patient instructed to cont prior HEP.  Exercises were reviewed and Pamella was able to demonstrate them prior to the end of the session.  Pamella demonstrated good  understanding of the education provided.     See EMR under Patient Instructions for exercises provided prior visit.    ASSESSMENT   Pt tolerated session well, emphasis placed on balance and gait. Pt had LOB posterior often needing min to mod A for safety. Gait training completed with SC, increased verbal cues and demo provided for sequencing and coordination. Increased LOB noted with short staggered stance and limited strategies post head motion.Pt demonstrated good understanding of exercises and required minimal cueing to maintain proper form.      Pamella Is progressing well towards her goals.   Pt prognosis is Good.     Pt will continue to benefit from skilled outpatient physical therapy to address the deficits listed in the problem list box on initial evaluation, provide pt/family education and to maximize pt's level of independence in the home and community environment.     Pt's spiritual, cultural and educational needs considered and pt agreeable to plan of care and goals.     Anticipated barriers to physical therapy: LOB and fall hazards    Goals:   Short Term Goals: In 4 weeks   1.I with HEP. progressing  2.Pt to increase lumbar ROM to reaching floor without R LE shaking or knees flexed with lumbar flexion.    3.Pt to increase hip AROM to hip extension to 5 degree lag or better to allow glutes to engage better.  4.Pt to increase L single leg stand time to 5 sec or better  5.Pt to have pain less than 5/10 at all times.  6.Pt to improve score on the FOTO by 10%.  7. Pt to be educated on postural/body mechanics awareness.     Long Term Goals: In 10 weeks 10/9/24  1.Patient to improve score on the FOTO to 65 or better.  2. Patient to demo increase in LE strength to hip abduction L to 5/5 and extension 4+ or better  3. Patient  to have decreased pain to 2/10 or better at worst.  4. Patient to demo increase lumbar ROM to pain free lateral trunk mobility and L side flexion to knee or better.  5. Patient to perform daily activities including squatting without lateral shift  6. Pt to perform dead lifting up to 40# or better to manage laundry load and carry up to 25# for 50 ft without LOB for  or other home tasks.        Plan   Plan of care Certification: 8/6/2024 to 10/9/24.     Outpatient Physical Therapy 2 times weekly for 10 weeks to include the following interventions: Cervical/Lumbar Traction, Electrical Stimulation IFC, NMES, Gait Training, Manual Therapy, Moist Heat/ Ice, Neuromuscular Re-ed, Patient Education, Self Care, Therapeutic Activities, Therapeutic Exercise, and DN.      Continue Plan of Care (POC) and progress per patient tolerance.    Eli Bravo, PT,

## 2024-09-27 ENCOUNTER — CLINICAL SUPPORT (OUTPATIENT)
Dept: REHABILITATION | Facility: HOSPITAL | Age: 74
End: 2024-09-27
Payer: MEDICARE

## 2024-09-27 DIAGNOSIS — R26.2 DIFFICULTY WALKING: Primary | ICD-10-CM

## 2024-09-27 PROCEDURE — 97110 THERAPEUTIC EXERCISES: CPT | Mod: PN

## 2024-09-27 PROCEDURE — 97112 NEUROMUSCULAR REEDUCATION: CPT | Mod: PN

## 2024-09-27 NOTE — PROGRESS NOTES
Physical Therapy Daily Treatment Note     Name: Pamella Ahmadi  Clinic Number: [id number]    Therapy Diagnosis:   Encounter Diagnosis   Name Primary?    Difficulty walking Yes     Physician: Carisa Martin MD    Visit Date: 9/27/2024    Physician Orders: PT Eval and Treat   Medical Diagnosis from Referral:   R29.898 (ICD-10-CM) - Right leg weakness   I69.359 (ICD-10-CM) - CVA, old, hemiparesis      Evaluation Date: 8/6/2024  Authorization Period Expiration: 12/31/24  Plan of Care Expiration: 10/9/24  Visit # / Visits authorized: 3/ 21  FOTO: 1/3  Progress note due 30 days from 8/6/24     Precautions: Standard, falls; LOB to the R, gets overheated easily at times; hx of dizziness but appears resolved even with high reps of trunk flexion today and rotation tasks      Time In: 10:30  Time Out: 11:15  Total Time: 45 minutes    SUBJECTIVE     Today, pt reports: feeling nauseated after riding in car for extended time    She was compliant with home exercise program.  Response to previous treatment: no issues  Functional change: none yet    Pre-Treatment Pain: 4/10  Post-Treatment Pain: 3/10  Location: hips  TREATMENT     Pamella received therapeutic exercises to develop strength, ROM, and posture for 10 minutes including:  Nustep level 1.6 8 min  Gastroc stretch 1min 2x      Pamella received the following manual therapy techniques: Joint mobilizations, Myofacial release, and Soft tissue Mobilization were applied to the: 0 for 0 minutes, including:      Pamella participated in neuromuscular re-education activities to improve: Balance, Kinesthetic, and Proprioception for 30 min one on one with PT then with tech assistance for remainder of session  minutes. The following activities were included:    Seated abdominal bracing and standing NBOS pressing up ,fwd, and lateral with yellow weighted ball 2x10  Shuttle 6 bands 3 min DL  Shuttle 4B SL 1min ea leg  Standing hip abd and extension 2x10             Pamella  participated in dynamic functional therapeutic activities to improve functional performance for 8 minutes, including:  Sit to stand from 18 inch box 2x 10 holding 10# ball 1x10  STS with gaze stab 1x10  S    Pamella participated in gait training to improve functional mobility and safety for 0  minutes with PT only, including:  SC gait training assessment and training; cues on cane in R hand with mod to max cues for sequencing     Not safe without gait belt and up to MIN A; difficulty coordinating    Pamella received the following direct contact modalities after being cleared for contraindications:     Pamella received the following supervised modalities after being cleared for contradictions:         Home Exercises Provided and Patient Education Provided     Education/Self-Care provided: (during therex)    Patient educated on the importance of improved core and upper and lower extremity strength in order to improve alignment of the spine and upper and lower extremities with static positions and dynamic movement.   Patient educated on the importance of strong core and lower extremity musculature in order to improve both static and dynamic balance, improve gait mechanics, reduce fall risk and improve household and community mobility.       Written Home Exercises Provided: Patient instructed to cont prior HEP.  Exercises were reviewed and Pamella was able to demonstrate them prior to the end of the session.  Pamella demonstrated good  understanding of the education provided.     See EMR under Patient Instructions for exercises provided prior visit.    ASSESSMENT   Pt did not feel well today due to nausea after riding in car for extended period. Limited balance training tolerated and pt unable to squat due to increased s/s. Pt reports two LOB in home last week both she was able to use box or chair. Static balance completed with weighted ball for challenge and abdominal bracing. Pt demonstrated good understanding of  exercises and required minimal cueing to maintain proper form.      Pamella Is progressing well towards her goals.   Pt prognosis is Good.     Pt will continue to benefit from skilled outpatient physical therapy to address the deficits listed in the problem list box on initial evaluation, provide pt/family education and to maximize pt's level of independence in the home and community environment.     Pt's spiritual, cultural and educational needs considered and pt agreeable to plan of care and goals.     Anticipated barriers to physical therapy: LOB and fall hazards    Goals:   Short Term Goals: In 4 weeks   1.I with HEP. progressing  2.Pt to increase lumbar ROM to reaching floor without R LE shaking or knees flexed with lumbar flexion.    3.Pt to increase hip AROM to hip extension to 5 degree lag or better to allow glutes to engage better.  4.Pt to increase L single leg stand time to 5 sec or better  5.Pt to have pain less than 5/10 at all times.  6.Pt to improve score on the FOTO by 10%.  7. Pt to be educated on postural/body mechanics awareness.     Long Term Goals: In 10 weeks 10/9/24  1.Patient to improve score on the FOTO to 65 or better.  2. Patient to demo increase in LE strength to hip abduction L to 5/5 and extension 4+ or better  3. Patient to have decreased pain to 2/10 or better at worst.  4. Patient to demo increase lumbar ROM to pain free lateral trunk mobility and L side flexion to knee or better.  5. Patient to perform daily activities including squatting without lateral shift  6. Pt to perform dead lifting up to 40# or better to manage laundry load and carry up to 25# for 50 ft without LOB for  or other home tasks.        Plan   Plan of care Certification: 8/6/2024 to 10/9/24.     Outpatient Physical Therapy 2 times weekly for 10 weeks to include the following interventions: Cervical/Lumbar Traction, Electrical Stimulation IFC, NMES, Gait Training, Manual Therapy, Moist Heat/ Ice,  Neuromuscular Re-ed, Patient Education, Self Care, Therapeutic Activities, Therapeutic Exercise, and DN.      Continue Plan of Care (POC) and progress per patient tolerance.    Eli Bravo, PT,

## 2024-10-08 ENCOUNTER — CLINICAL SUPPORT (OUTPATIENT)
Dept: REHABILITATION | Facility: HOSPITAL | Age: 74
End: 2024-10-08
Payer: MEDICARE

## 2024-10-08 DIAGNOSIS — R26.2 DIFFICULTY WALKING: Primary | ICD-10-CM

## 2024-10-08 PROCEDURE — 97110 THERAPEUTIC EXERCISES: CPT | Mod: PN

## 2024-10-08 PROCEDURE — 97530 THERAPEUTIC ACTIVITIES: CPT | Mod: PN

## 2024-10-08 PROCEDURE — 97112 NEUROMUSCULAR REEDUCATION: CPT | Mod: PN

## 2024-10-08 NOTE — PROGRESS NOTES
Physical Therapy Daily Treatment Note     Name: Pamella Ahmadi  Clinic Number: [id number]    Therapy Diagnosis:   Encounter Diagnosis   Name Primary?    Difficulty walking Yes     Physician: Carisa Martin MD    Visit Date: 10/8/2024    Physician Orders: PT Eval and Treat   Medical Diagnosis from Referral:   R29.898 (ICD-10-CM) - Right leg weakness   I69.359 (ICD-10-CM) - CVA, old, hemiparesis      Evaluation Date: 8/6/2024  Authorization Period Expiration: 12/31/24  Plan of Care Expiration: 12/19/24  Visit # / Visits authorized: 4/ 21  FOTO: 1/3  Progress note due 30 days from 8/6/24     Precautions: Standard, falls; LOB to the R, gets overheated easily at times; hx of dizziness but appears resolved even with high reps of trunk flexion today and rotation tasks      Time In: 11:22  Time Out: 12:15  Total Time: 55 minutes    SUBJECTIVE     Today, pt reports: feeling great    She was compliant with home exercise program.  Response to previous treatment: no issues  Functional change: none yet    Pre-Treatment Pain: 4/10  Post-Treatment Pain: 3/10  Location: hips  TREATMENT     Pamella received therapeutic exercises to develop strength, ROM, and posture for 10 minutes including:  Nustep level 1.6 8 min  Gastroc stretch 1min 2x      Pamella received the following manual therapy techniques: Joint mobilizations, Myofacial release, and Soft tissue Mobilization were applied to the: 0 for 0 minutes, including:      Pamella participated in neuromuscular re-education activities to improve: Balance, Kinesthetic, and Proprioception for 20 min one on one with PT then with tech assistance for remainder of session  minutes. The following activities were included:    Seated abdominal bracing and standing NBOS pressing up ,fwd, and lateral with yellow weighted ball 2x10  NBOS: head rotation and nod 1x10 eyes open and closed mod A for safety  Staggered stance with head rotation and nod 1x10 mod A for safety   Foam balance  mod A   Wide MOLLY with trunk rot and head rot to tap target on wall 2x10            Pamella participated in dynamic functional therapeutic activities to improve functional performance for 8 minutes, including:  Sit to stand from 18 inch box 2x 10 holding 10# ball 1x10  STS with gaze stab 1x10  Half kneel to tall kneel to seated on floor  Floor to qp to 1/2 kneel to scoot onto mat repeated on both sides, 3x on right  S    Pamella participated in gait training to improve functional mobility and safety for 0  minutes with PT only, including:  SC gait training assessment and training; cues on cane in R hand with mod to max cues for sequencing     Not safe without gait belt and up to MIN A; difficulty coordinating    Pamella received the following direct contact modalities after being cleared for contraindications:     Pamella received the following supervised modalities after being cleared for contradictions:         Home Exercises Provided and Patient Education Provided     Education/Self-Care provided: (during therex)    Patient educated on the importance of improved core and upper and lower extremity strength in order to improve alignment of the spine and upper and lower extremities with static positions and dynamic movement.   Patient educated on the importance of strong core and lower extremity musculature in order to improve both static and dynamic balance, improve gait mechanics, reduce fall risk and improve household and community mobility.       Written Home Exercises Provided: Patient instructed to cont prior HEP.  Exercises were reviewed and Pamella was able to demonstrate them prior to the end of the session.  Pamella demonstrated good  understanding of the education provided.     See EMR under Patient Instructions for exercises provided prior visit.    ASSESSMENT   Pt with improved endurance this session. She was able to return to standing balance and VRT with CGA for safety. Initiated floor transfers  using 1/2 kneel and lateral support on right to scoot her bottom to support surface. Pt would like to  sit on floor with 8 month old grand daughter. Discussed safety strategies on home, such as sitting to don/doff shoes and socks as well as using AD in home during ambulation.     Pamella Is progressing well towards her goals.   Pt prognosis is Good.     Pt will continue to benefit from skilled outpatient physical therapy to address the deficits listed in the problem list box on initial evaluation, provide pt/family education and to maximize pt's level of independence in the home and community environment.     Pt's spiritual, cultural and educational needs considered and pt agreeable to plan of care and goals.     Anticipated barriers to physical therapy: LOB and fall hazards    Goals:   Short Term Goals: In 4 weeks   1.I with HEP. progressing  2.Pt to increase lumbar ROM to reaching floor without R LE shaking or knees flexed with lumbar flexion.    3.Pt to increase hip AROM to hip extension to 5 degree lag or better to allow glutes to engage better.  4.Pt to increase L single leg stand time to 5 sec or better  5.Pt to have pain less than 5/10 at all times.  6.Pt to improve score on the FOTO by 10%.  7. Pt to be educated on postural/body mechanics awareness.     Long Term Goals: In 10 weeks 10/9/24  1.Patient to improve score on the FOTO to 65 or better.  2. Patient to demo increase in LE strength to hip abduction L to 5/5 and extension 4+ or better  3. Patient to have decreased pain to 2/10 or better at worst.  4. Patient to demo increase lumbar ROM to pain free lateral trunk mobility and L side flexion to knee or better.  5. Patient to perform daily activities including squatting without lateral shift  6. Pt to perform dead lifting up to 40# or better to manage laundry load and carry up to 25# for 50 ft without LOB for  or other home tasks.        Plan   Plan of care Certification: 8/6/2024 to  12/19/24.     Outpatient Physical Therapy 2 times weekly for 10 weeks to include the following interventions: Cervical/Lumbar Traction, Electrical Stimulation IFC, NMES, Gait Training, Manual Therapy, Moist Heat/ Ice, Neuromuscular Re-ed, Patient Education, Self Care, Therapeutic Activities, Therapeutic Exercise, and DN.      Continue Plan of Care (POC) and progress per patient tolerance.    Eli Bravo, PT,

## 2024-10-08 NOTE — PROGRESS NOTES
Physical Therapy Daily Treatment Note     Name: Pamella Ahmadi  Clinic Number: [id number]    Therapy Diagnosis:   No diagnosis found.    Physician: Carisa Martin MD    Visit Date: 10/8/2024    Physician Orders: PT Eval and Treat   Medical Diagnosis from Referral:   R29.898 (ICD-10-CM) - Right leg weakness   I69.359 (ICD-10-CM) - CVA, old, hemiparesis      Evaluation Date: 8/6/2024  Authorization Period Expiration: 12/31/24  Plan of Care Expiration: 10/9/24  Visit # / Visits authorized: 4/ 21  FOTO: 1/3  Progress note due 30 days from 8/6/24     Precautions: Standard, falls; LOB to the R, gets overheated easily at times; hx of dizziness but appears resolved even with high reps of trunk flexion today and rotation tasks      Time In: 11:23  Time Out: 12:08  Total Time: 45 minutes    SUBJECTIVE     Today, pt reports: feeling much better than previous session    She was compliant with home exercise program.  Response to previous treatment: no issues  Functional change: none yet    Pre-Treatment Pain: 4/10  Post-Treatment Pain: 3/10  Location: hips  TREATMENT     Pamella received therapeutic exercises to develop strength, ROM, and posture for 8 minutes including:  Nustep level 1.6 8 min      Pamella received the following manual therapy techniques: Joint mobilizations, Myofacial release, and Soft tissue Mobilization were applied to the: 0 for 0 minutes, including:      Pamella participated in neuromuscular re-education activities to improve: Balance, Kinesthetic, and Proprioception for 14 min one on one with PT then with tech assistance for remainder of session  minutes. The following activities were included:    NBOS: head rotation and nod 1x10 eyes open and closed, stand-by assist  WBOS with wall targets lateral, OH, and floor w/ yellow SB              Pamella participated in dynamic functional therapeutic activities to improve functional performance for 23 minutes, including:  STS with gaze stab 1x10  (horizontal and vertical)  Floor transfer from table to mat x4  S    Pamella participated in gait training to improve functional mobility and safety for 0 minutes with PT only, including:  SC gait training assessment and training; cues on cane in R hand with mod to max cues for sequencing     Not safe without gait belt and up to MIN A; difficulty coordinating    Pamella received the following direct contact modalities after being cleared for contraindications:     Pamella received the following supervised modalities after being cleared for contradictions:         Home Exercises Provided and Patient Education Provided     Education/Self-Care provided: (during therex)    Patient educated on the importance of improved core and upper and lower extremity strength in order to improve alignment of the spine and upper and lower extremities with static positions and dynamic movement.   Patient educated on the importance of strong core and lower extremity musculature in order to improve both static and dynamic balance, improve gait mechanics, reduce fall risk and improve household and community mobility.       Written Home Exercises Provided: Patient instructed to cont prior HEP.  Exercises were reviewed and Pamella was able to demonstrate them prior to the end of the session.  Pamella demonstrated good  understanding of the education provided.     See EMR under Patient Instructions for exercises provided prior visit.    ASSESSMENT   Pt tolerated treatment fairly well today. Activities were appropriately challenging, targeting pt's static and dynamic balance capabilities, functional LE strength and mobility, and dual task abilities. Pt demonstrated increased difficulty performing activities that required increased cognitive demand. [id number] transfers were completed today in order to ensure safe transfer ability while playing with her grandchildren. Pt demonstrated decreased ability to shift weight to supporting LE  during ascent; plan to incorporate exercises to address functional LE strength deficits at next session.       Pamella Is progressing well towards her goals.   Pt prognosis is Good.     Pt will continue to benefit from skilled outpatient physical therapy to address the deficits listed in the problem list box on initial evaluation, provide pt/family education and to maximize pt's level of independence in the home and community environment.     Pt's spiritual, cultural and educational needs considered and pt agreeable to plan of care and goals.     Anticipated barriers to physical therapy: LOB and fall hazards    Goals:   Short Term Goals: In 4 weeks   1.I with HEP. progressing  2.Pt to increase lumbar ROM to reaching floor without R LE shaking or knees flexed with lumbar flexion.    3.Pt to increase hip AROM to hip extension to 5 degree lag or better to allow glutes to engage better.  4.Pt to increase L single leg stand time to 5 sec or better  5.Pt to have pain less than 5/10 at all times.  6.Pt to improve score on the FOTO by 10%.  7. Pt to be educated on postural/body mechanics awareness.     Long Term Goals: In 10 weeks 10/9/24  1.Patient to improve score on the FOTO to 65 or better.  2. Patient to demo increase in LE strength to hip abduction L to 5/5 and extension 4+ or better  3. Patient to have decreased pain to 2/10 or better at worst.  4. Patient to demo increase lumbar ROM to pain free lateral trunk mobility and L side flexion to knee or better.  5. Patient to perform daily activities including squatting without lateral shift  6. Pt to perform dead lifting up to 40# or better to manage laundry load and carry up to 25# for 50 ft without LOB for  or other home tasks.        Plan   Plan of care Certification: 8/6/2024 to 10/9/24.     Outpatient Physical Therapy 2 times weekly for 10 weeks to include the following interventions: Cervical/Lumbar Traction, Electrical Stimulation IFC, NMES, Gait  Training, Manual Therapy, Moist Heat/ Ice, Neuromuscular Re-ed, Patient Education, Self Care, Therapeutic Activities, Therapeutic Exercise, and DN.      Continue Plan of Care (POC) and progress per patient tolerance.    Shilpa Barnhart, SPT,

## 2024-10-22 ENCOUNTER — CLINICAL SUPPORT (OUTPATIENT)
Dept: REHABILITATION | Facility: HOSPITAL | Age: 74
End: 2024-10-22
Payer: MEDICARE

## 2024-10-22 DIAGNOSIS — R26.2 DIFFICULTY WALKING: Primary | ICD-10-CM

## 2024-10-22 PROCEDURE — 97530 THERAPEUTIC ACTIVITIES: CPT | Mod: PN

## 2024-10-22 PROCEDURE — 97112 NEUROMUSCULAR REEDUCATION: CPT | Mod: PN

## 2024-10-22 NOTE — PROGRESS NOTES
Physical Therapy Daily Treatment Note     Name: Pamella Ahmadi  Clinic Number: [id number]    Therapy Diagnosis:   Encounter Diagnosis   Name Primary?    Difficulty walking Yes     Physician: Carisa Martin MD    Visit Date: 10/22/2024    Physician Orders: PT Eval and Treat   Medical Diagnosis from Referral:   R29.898 (ICD-10-CM) - Right leg weakness   I69.359 (ICD-10-CM) - CVA, old, hemiparesis      Evaluation Date: 8/6/2024  Authorization Period Expiration: 12/31/24  Plan of Care Expiration: 12/19/24  Visit # / Visits authorized: 5/ 21  FOTO: 1/3  Progress note due 30 days from 8/6/24     Precautions: Standard, falls; LOB to the R, gets overheated easily at times; hx of dizziness but appears resolved even with high reps of trunk flexion today and rotation tasks      Time In: 11:15  Time Out: 12:00  Total Time: 45 minutes    SUBJECTIVE     Today, pt reports: feeling a little tired    She was compliant with home exercise program.  Response to previous treatment: no issues  Functional change: none yet    Pre-Treatment Pain: 4/10  Post-Treatment Pain: 3/10  Location: hips  TREATMENT     Pamella received therapeutic exercises to develop strength, ROM, and posture for 0 minutes including:  Nustep level 1.6 8 min  Gastroc stretch 1min 2x      Pamella received the following manual therapy techniques: Joint mobilizations, Myofacial release, and Soft tissue Mobilization were applied to the: 0 for 0 minutes, including:      Pamella participated in neuromuscular re-education activities to improve: Balance, Kinesthetic, and Proprioception for 30 min one on one with PT then with tech assistance for remainder of session  minutes. The following activities were included:    Seated abdominal bracing and standing NBOS pressing up ,fwd, and lateral with yellow weighted ball 2x10  Shuttle 6 bands 3 min DL  Shuttle 4B SL 1min ea leg  Standing hip abd and extension 2x10   1/2 kneel PPT hold with press fwd with hands  Yosef  NBOS: head rotation and nod 1x10 eyes open and closed mod A for safety  Staggered stance with head rotation and nod 1x10 mod A for safety   Foam balance mod A   Wide MOLLY with trunk rot and head rot to tap target on wall 2x10          Pamella participated in dynamic functional therapeutic activities to improve functional performance for 15 minutes, including:  Sit to stand from 18 inch box 2x 10 holding 10# ball 1x10  STS with gaze stab 1x10  1/2 kneel on foam with one hand on box for support to stand floor t/f 2x    Pt did fall backwards onto bottom but reports no pain and easily giorgi to maneuver to other side       Pamella participated in gait training to improve functional mobility and safety for 0  minutes with PT only, including:  SC gait training assessment and training; cues on cane in R hand with mod to max cues for sequencing     Not safe without gait belt and up to MIN A; difficulty coordinating    Pamella received the following direct contact modalities after being cleared for contraindications:     Pamella received the following supervised modalities after being cleared for contradictions:         Home Exercises Provided and Patient Education Provided     Education/Self-Care provided: (during therex)    Patient educated on the importance of improved core and upper and lower extremity strength in order to improve alignment of the spine and upper and lower extremities with static positions and dynamic movement.   Patient educated on the importance of strong core and lower extremity musculature in order to improve both static and dynamic balance, improve gait mechanics, reduce fall risk and improve household and community mobility.       Written Home Exercises Provided: Patient instructed to cont prior HEP.  Exercises were reviewed and Pamella was able to demonstrate them prior to the end of the session.  Pamella demonstrated good  understanding of the education provided.     See EMR under  Patient Instructions for exercises provided prior visit.    ASSESSMENT    She was able to return to standing balance and VRT with CGA for safety. Cont floor transfers using 1/2 kneel and lateral support on right to scoot her bottom to support surface. Pt very fatigued after floor transfers. Discussed safety strategies on home, such as sitting to don/doff shoes and socks as well as using AD in home during ambulation.     Pamella Is progressing well towards her goals.   Pt prognosis is Good.     Pt will continue to benefit from skilled outpatient physical therapy to address the deficits listed in the problem list box on initial evaluation, provide pt/family education and to maximize pt's level of independence in the home and community environment.     Pt's spiritual, cultural and educational needs considered and pt agreeable to plan of care and goals.     Anticipated barriers to physical therapy: LOB and fall hazards    Goals:   Short Term Goals: In 4 weeks   1.I with HEP. progressing  2.Pt to increase lumbar ROM to reaching floor without R LE shaking or knees flexed with lumbar flexion.    3.Pt to increase hip AROM to hip extension to 5 degree lag or better to allow glutes to engage better.  4.Pt to increase L single leg stand time to 5 sec or better  5.Pt to have pain less than 5/10 at all times.  6.Pt to improve score on the FOTO by 10%.  7. Pt to be educated on postural/body mechanics awareness.     Long Term Goals: In 10 weeks 10/9/24  1.Patient to improve score on the FOTO to 65 or better.  2. Patient to demo increase in LE strength to hip abduction L to 5/5 and extension 4+ or better  3. Patient to have decreased pain to 2/10 or better at worst.  4. Patient to demo increase lumbar ROM to pain free lateral trunk mobility and L side flexion to knee or better.  5. Patient to perform daily activities including squatting without lateral shift  6. Pt to perform dead lifting up to 40# or better to manage laundry load  and carry up to 25# for 50 ft without LOB for  or other home tasks.        Plan   Plan of care Certification: 8/6/2024 to 12/19/24.     Outpatient Physical Therapy 2 times weekly for 10 weeks to include the following interventions: Cervical/Lumbar Traction, Electrical Stimulation IFC, NMES, Gait Training, Manual Therapy, Moist Heat/ Ice, Neuromuscular Re-ed, Patient Education, Self Care, Therapeutic Activities, Therapeutic Exercise, and DN.      Continue Plan of Care (POC) and progress per patient tolerance.    Eli Bravo, PT,

## 2024-12-09 ENCOUNTER — CLINICAL SUPPORT (OUTPATIENT)
Dept: REHABILITATION | Facility: HOSPITAL | Age: 74
End: 2024-12-09
Payer: MEDICARE

## 2024-12-09 DIAGNOSIS — R26.2 DIFFICULTY WALKING: Primary | ICD-10-CM

## 2024-12-09 PROCEDURE — 97110 THERAPEUTIC EXERCISES: CPT | Mod: PN

## 2024-12-09 PROCEDURE — 97112 NEUROMUSCULAR REEDUCATION: CPT | Mod: PN

## 2024-12-09 PROCEDURE — 97530 THERAPEUTIC ACTIVITIES: CPT | Mod: PN

## 2024-12-09 NOTE — PROGRESS NOTES
Physical Therapy Daily Treatment Note     Name: Pamella Ahmadi  Clinic Number: [id number]    Therapy Diagnosis:   Encounter Diagnosis   Name Primary?    Difficulty walking Yes     Physician: Carisa Martin MD    Visit Date: 12/9/2024    Physician Orders: PT Eval and Treat   Medical Diagnosis from Referral:   R29.898 (ICD-10-CM) - Right leg weakness   I69.359 (ICD-10-CM) - CVA, old, hemiparesis      Evaluation Date: 8/6/2024  Authorization Period Expiration: 12/31/24  Plan of Care Expiration: 12/19/24  Visit # / Visits authorized: 6/ 21  FOTO: 1/3  Progress note due 30 days from 8/6/24     Precautions: Standard, falls; LOB to the R, gets overheated easily at times; hx of dizziness but appears resolved even with high reps of trunk flexion today and rotation tasks      Time In: 11:30  Time Out: 12:10  Total Time: 45 minutes    SUBJECTIVE     Today, pt reports: she is a little wobbly but starting to walk in home without RW    She was compliant with home exercise program.  Response to previous treatment: no issues  Functional change: none yet    Pre-Treatment Pain: 4/10  Post-Treatment Pain: 3/10  Location: hips  TREATMENT     Pamella received therapeutic exercises to develop strength, ROM, and posture for12 minutes including:  Nustep level 1.6 10 min  Gastroc stretch 1min 2x      Pamella received the following manual therapy techniques: Joint mobilizations, Myofacial release, and Soft tissue Mobilization were applied to the: 0 for 0 minutes, including:      Pamella participated in neuromuscular re-education activities to improve: Balance, Kinesthetic, and Proprioception for 15 min. The following activities were included:    Standing hip abd and extension 2x10   Staggered stance: head rotation and nod 1x10 eyes open and closed mod A for safety   Foam balance mod A   Wide MOLLY with trunk rot and head rot to tap target on wall 2x10  Amb in gym 2 laps with RW and 1 without RW 2 LOB that required mod A for  "correction           Pamella participated in dynamic functional therapeutic activities to improve functional performance for 15 minutes, including:  Sit to stand from 18 inch box 2x 10 holding 10# ball 1x10  STS with gaze stab 1x10 hands crossed at chest  Squat holding non weighted ball to 12" box with OH press 1x10  Squat holding non weighted ball to floor followed by vertical toss of ball 1x10      Pamella participated in gait training to improve functional mobility and safety for 0  minutes with PT only, including:  SC gait training assessment and training; cues on cane in R hand with mod to max cues for sequencing     Not safe without gait belt and up to MIN A; difficulty coordinating    Pamella received the following direct contact modalities after being cleared for contraindications:     Pamella received the following supervised modalities after being cleared for contradictions:         Home Exercises Provided and Patient Education Provided     Education/Self-Care provided: (during therex)    Patient educated on the importance of improved core and upper and lower extremity strength in order to improve alignment of the spine and upper and lower extremities with static positions and dynamic movement.   Patient educated on the importance of strong core and lower extremity musculature in order to improve both static and dynamic balance, improve gait mechanics, reduce fall risk and improve household and community mobility.       Written Home Exercises Provided: Patient instructed to cont prior HEP.  Exercises were reviewed and Pamella was able to demonstrate them prior to the end of the session.  Pamella demonstrated good  understanding of the education provided.     See EMR under Patient Instructions for exercises provided prior visit.    ASSESSMENT    She was able to return to balance and VRT with CGA for safety. Initiated ambulation without RW weaving and veering and cross over step noted with LOB if she " stark her head or lost concentration. Improved STS tolerance and balance but Yosef needed to squat to floor due to lob. Pt encouraged to cont with functional mobility HEP for safety in home.   Pamella Is progressing well towards her goals.   Pt prognosis is Good.     Pt will continue to benefit from skilled outpatient physical therapy to address the deficits listed in the problem list box on initial evaluation, provide pt/family education and to maximize pt's level of independence in the home and community environment.     Pt's spiritual, cultural and educational needs considered and pt agreeable to plan of care and goals.     Anticipated barriers to physical therapy: LOB and fall hazards    Goals:   Short Term Goals: In 4 weeks   1.I with HEP. progressing  2.Pt to increase lumbar ROM to reaching floor without R LE shaking or knees flexed with lumbar flexion.    3.Pt to increase hip AROM to hip extension to 5 degree lag or better to allow glutes to engage better.  4.Pt to increase L single leg stand time to 5 sec or better  5.Pt to have pain less than 5/10 at all times.  6.Pt to improve score on the FOTO by 10%.  7. Pt to be educated on postural/body mechanics awareness.     Long Term Goals: In 10 weeks 10/9/24  1.Patient to improve score on the FOTO to 65 or better.  2. Patient to demo increase in LE strength to hip abduction L to 5/5 and extension 4+ or better  3. Patient to have decreased pain to 2/10 or better at worst.  4. Patient to demo increase lumbar ROM to pain free lateral trunk mobility and L side flexion to knee or better.  5. Patient to perform daily activities including squatting without lateral shift  6. Pt to perform dead lifting up to 40# or better to manage laundry load and carry up to 25# for 50 ft without LOB for  or other home tasks.        Plan   Plan of care Certification: 8/6/2024 to 12/19/24.     Outpatient Physical Therapy 2 times weekly for 10 weeks to include the following  interventions: Cervical/Lumbar Traction, Electrical Stimulation IFC, NMES, Gait Training, Manual Therapy, Moist Heat/ Ice, Neuromuscular Re-ed, Patient Education, Self Care, Therapeutic Activities, Therapeutic Exercise, and DN.      Continue Plan of Care (POC) and progress per patient tolerance.    Eli Bravo, PT,